# Patient Record
Sex: FEMALE | Race: BLACK OR AFRICAN AMERICAN | Employment: UNEMPLOYED | URBAN - METROPOLITAN AREA
[De-identification: names, ages, dates, MRNs, and addresses within clinical notes are randomized per-mention and may not be internally consistent; named-entity substitution may affect disease eponyms.]

---

## 2017-01-20 ENCOUNTER — HOSPITAL ENCOUNTER (OUTPATIENT)
Dept: OBGYN CLINIC | Age: 27
Discharge: HOME | End: 2017-01-20
Admitting: OBSTETRICS & GYNECOLOGY

## 2017-01-20 VITALS
WEIGHT: 145 LBS | DIASTOLIC BLOOD PRESSURE: 67 MMHG | HEART RATE: 80 BPM | BODY MASS INDEX: 25.69 KG/M2 | SYSTOLIC BLOOD PRESSURE: 107 MMHG

## 2017-01-22 LAB
ORGANISM: ABNORMAL
URINE CULTURE, ROUTINE: ABNORMAL
URINE CULTURE, ROUTINE: ABNORMAL

## 2017-01-23 LAB
CLARITY: CLEAR
COLOR: YELLOW
GLUCOSE URINE: NEGATIVE MG/DL
PROTEIN UA: NEGATIVE MG/DL

## 2017-02-17 ENCOUNTER — HOSPITAL ENCOUNTER (OUTPATIENT)
Dept: OBGYN CLINIC | Age: 27
Discharge: HOME | End: 2017-02-17
Admitting: OBSTETRICS & GYNECOLOGY

## 2017-02-17 VITALS
DIASTOLIC BLOOD PRESSURE: 74 MMHG | SYSTOLIC BLOOD PRESSURE: 113 MMHG | HEART RATE: 120 BPM | WEIGHT: 147 LBS | BODY MASS INDEX: 26.04 KG/M2

## 2017-03-03 ENCOUNTER — HOSPITAL ENCOUNTER (OUTPATIENT)
Dept: OBGYN CLINIC | Age: 27
Discharge: HOME | End: 2017-03-04
Admitting: OBSTETRICS & GYNECOLOGY

## 2017-03-03 VITALS
BODY MASS INDEX: 26.93 KG/M2 | SYSTOLIC BLOOD PRESSURE: 106 MMHG | DIASTOLIC BLOOD PRESSURE: 68 MMHG | HEART RATE: 99 BPM | WEIGHT: 152 LBS

## 2017-03-06 LAB
CLARITY: CLEAR
COLOR: YELLOW
GLUCOSE URINE: NEGATIVE MG/DL
PROTEIN UA: NEGATIVE MG/DL

## 2017-03-23 PROBLEM — R52 PAIN: Status: ACTIVE | Noted: 2017-03-23

## 2017-05-05 ENCOUNTER — HOSPITAL ENCOUNTER (OUTPATIENT)
Dept: OBGYN CLINIC | Age: 27
Discharge: HOME | End: 2017-05-06
Admitting: OBSTETRICS & GYNECOLOGY

## 2017-05-05 VITALS
DIASTOLIC BLOOD PRESSURE: 75 MMHG | BODY MASS INDEX: 23.24 KG/M2 | SYSTOLIC BLOOD PRESSURE: 117 MMHG | WEIGHT: 131.2 LBS | HEART RATE: 90 BPM

## 2017-12-15 ENCOUNTER — HOSPITAL ENCOUNTER (OUTPATIENT)
Dept: OBGYN CLINIC | Age: 27
Discharge: HOME | End: 2017-12-31
Attending: OBSTETRICS & GYNECOLOGY | Admitting: OBSTETRICS & GYNECOLOGY

## 2018-01-01 ENCOUNTER — HOSPITAL ENCOUNTER (OUTPATIENT)
Dept: OBGYN CLINIC | Age: 28
Discharge: HOME | End: 2018-01-31
Attending: OBSTETRICS & GYNECOLOGY | Admitting: OBSTETRICS & GYNECOLOGY

## 2018-01-26 ENCOUNTER — HOSPITAL ENCOUNTER (OUTPATIENT)
Dept: ULTRASOUND IMAGING | Age: 28
Discharge: HOME | End: 2018-01-26
Attending: OBSTETRICS & GYNECOLOGY | Admitting: OBSTETRICS & GYNECOLOGY

## 2018-01-26 DIAGNOSIS — Z34.90 ENCOUNTER FOR SUPERVISION OF NORMAL PREGNANCY: ICD-10-CM

## 2018-01-26 DIAGNOSIS — Z34.90 ENCOUNTER FOR SUPERVISION OF NORMAL PREGNANCY, ANTEPARTUM, UNSPECIFIED GRAVIDITY: ICD-10-CM

## 2018-01-26 PROBLEM — Z34.81 NORMAL PREGNANCY IN MULTIGRAVIDA IN FIRST TRIMESTER: Status: ACTIVE | Noted: 2018-01-26

## 2018-01-26 PROBLEM — N89.8 VAGINAL DISCHARGE DURING PREGNANCY: Status: ACTIVE | Noted: 2018-01-26

## 2018-01-26 PROBLEM — R52 PAIN: Status: RESOLVED | Noted: 2017-03-23 | Resolved: 2018-01-26

## 2018-01-26 PROBLEM — O26.899 VAGINAL DISCHARGE DURING PREGNANCY: Status: ACTIVE | Noted: 2018-01-26

## 2018-02-01 ENCOUNTER — HOSPITAL ENCOUNTER (OUTPATIENT)
Dept: OBGYN CLINIC | Age: 28
Discharge: HOME | End: 2018-02-28
Attending: OBSTETRICS & GYNECOLOGY | Admitting: OBSTETRICS & GYNECOLOGY

## 2018-02-27 ENCOUNTER — HOSPITAL ENCOUNTER (OUTPATIENT)
Dept: OBGYN CLINIC | Age: 28
Discharge: HOME | End: 2018-02-28
Admitting: OBSTETRICS & GYNECOLOGY

## 2018-02-27 VITALS
DIASTOLIC BLOOD PRESSURE: 67 MMHG | BODY MASS INDEX: 23.38 KG/M2 | SYSTOLIC BLOOD PRESSURE: 113 MMHG | WEIGHT: 132 LBS | HEART RATE: 83 BPM

## 2018-02-27 DIAGNOSIS — Z34.81 NORMAL PREGNANCY IN MULTIGRAVIDA IN FIRST TRIMESTER: ICD-10-CM

## 2018-02-27 DIAGNOSIS — Z34.90 ENCOUNTER FOR SUPERVISION OF NORMAL PREGNANCY: ICD-10-CM

## 2018-02-27 LAB
BILIRUBIN URINE: NEGATIVE MG/DL
BLOOD, URINE: ABNORMAL
CLARITY: CLEAR
COLOR: YELLOW
GLUCOSE URINE: NEGATIVE MG/DL
KETONES, URINE: NEGATIVE MG/DL
LEUKOCYTE ESTERASE, URINE: NEGATIVE
NITRITE, URINE: NEGATIVE
PH UA: 7
PROTEIN UA: NEGATIVE MG/DL
SPECIFIC GRAVITY UA: 1.01
UROBILINOGEN, URINE: 0.2 E.U./DL

## 2018-02-27 PROCEDURE — 0502F SUBSEQUENT PRENATAL CARE: CPT | Performed by: OBSTETRICS & GYNECOLOGY

## 2018-02-27 NOTE — PROGRESS NOTES
Prenatal [id number], [address] [facility], [facility] 24      OB Follow-Up Visit Progress Note    Chief Complaint   Patient presents with    Routine Prenatal Visit        Subjective:     HISTORY OF PRESENT ILLNESS (HPI)    History of  Mirna Palacios is a 33 y.o., [id number] at 16w1d who presents to the clinic for her follow-up OB visit. Lindy Schwartz  denies any complaints at this time. She does not complain of vaginal bleeding. She does not report leakage of fluid. She  does not complain of contractions. She  does not complain of decreased fetal movement. PAST MEDICAL HISTORY        Diagnosis Date    Anemia     with last pregnancy    Miscarriage     Miscarriage     D and C         PAST SURGICAL HISTORY    Past Surgical History:   Procedure Laterality Date    DILATION AND CURETTAGE OF UTERUS      MAB       FAMILY HISTORY    Family History   Problem Relation Age of Onset    Diabetes Mother     Diabetes Maternal Grandmother     Diabetes Maternal Uncle        SOCIAL HISTORY    Social History   Substance Use Topics    Smoking status: Never Smoker    Smokeless tobacco: Never Used    Alcohol use No       ALLERGIES    No Known Allergies    MEDICATIONS    Current Outpatient Prescriptions on File Prior to Encounter   Medication Sig Dispense Refill    ferrous sulfate (FE TABS) 325 (65 Fe) MG EC tablet Take 1 tablet by mouth daily (with breakfast) 90 tablet 3    Prenatal Multivit-Min-Fe-FA (PRENATAL VITAMINS) 0.8 MG TABS Take 1 tablet by mouth daily 30 tablet 5     No current facility-administered medications on file prior to encounter. REVIEW OF SYSTEMS    Pertinent items are noted in HPI.     Objective:      /67   Pulse 83   Wt 132 lb (59.9 kg)   LMP 11/06/2017   BMI 23.38 kg/m²     Wt Readings from Last 2 Encounters:   02/27/18 132 lb (59.9 kg)   01/26/18 123 lb 9.6 oz (56.1 kg)       See ACOG flowsheet above    Physical Exam:  A physical exam was performed during this visit. Neurologic/Psychiatric: alert and oriented X3  Constitutional: alert and oriented to person, place and time, well-developed and well-nourished, in no acute distress  Cardiovascular: Edema  is not present. Respiratory effort: Respirations  are easy and without stridor. Gastrointestinal: Abdomen  is soft and  is non tender and gravid  Vagina: Not examined at this visit. Bladder: Not examined at this visit. Uterus:  Non tender. Pelvic Exam:  A pelvic exam was not completed during this visit. Verbal consent obtained for pelvic exam:  NA  Cervix: N/A. Specimens obtained: None. Response to pelvic exam:  na      Assessment:     Patient Active Problem List   Diagnosis Code    Sickle cell trait ([facility] 75.) D57.3    32 yo [id number] Z34.81    Vaginal discharge during pregnancy O26.899, N89.8       33 y.o. [id number] 16w1d    Obstetrician: Yovanny Meier MD      Plan:     - Prenatal labs: Reviewed by the physician  - Patient to follow up in: 1 month,Quad disc declined,US planned    Kick count N/A. Pregnancy expectations were discussed and all questions were answered.             Electronically signed by Yovanny Meier MD on 2/27/2018 at 1:28 PM

## 2018-02-27 NOTE — PLAN OF CARE
Prenatal Clinic  Clinical Level of [address]    NAME: Reji Herrera OF BIRTH:  1990 GENDER: female  MEDICAL RECORD NUMBER:  [id number]  DATE:  2/27/2018    Assessment   Points   No Assessment []   0   General Prenatal assessment (e.g. weight, vital signs, urine test). Completed OB Clinic navigator tabs, in partnership with the Registered Nurse. [x]   1   General Prenatal assessment (e.g. weight, vital signs, urine test). Completed OB Clinic navigator tabs, in partnership with the Registered Nurse. Set up tests or procedures (e.g. ultrasound, specimen swabs, induction). []   2   Full Prenatal assessment (e.g. weight, vitals signs, urine test) to include a full review of history. Completed OB Clinic navigator tabs, in partnership with the Registered Nurse. Set up tests or procedures (e.g. ultrasound, specimen swabs, induction). Or transfer to an outside facility, transfer to [facility] for further evaluation, admission to the hospital.  []   3       Ambulation Status   Status Definition Points   Independent Independently able to ambulate. Fully able (without any assistance) to get on/off exam table/chair. [x]   0   Minimal Physical Assistance Requires physical assistance of one person to ambulate and/or position patient to be examined. Includes necessary physical assistance to position lower extremities on/off stool. []   1   Moderate Physical Assistance Requires at least one staff member to physically assist patient in ambulating into treatment room, and on/off recliner chair. []   2   Full Assistance Requires assistance of at least two staff members to transfer patient into treatment room and/or on/off exam table/chair. \"Total Transfer\". []   3       Teaching Effort   Effort Definition Points   No Teaching  []   0   General The teaching will focus on visit schedule and laboratory tests. This education can be found in the Discharge Instructions.  [x]   1 Intermediate The teaching will focus on visit schedule and laboratory tests, plus additional topics such as health and lifestyle (e.g. kick counts, diet). This education can be found in the Discharge Instructions []   2   Complex New patient information packet given to the patient/caregiver and reviewed with the Registered Nurse.   []   3       Patient Discharge and Planning  Planning Definition Points   General Follow-up with routine assessment and planning. Discharge instructions and After Visit Summary given to patient/caregiver and reviewed with the Registered Nurse. Simple follow-up with routine assessment and planning. [x]   1   Intermediate Follow-up with routine assessment and planning. Discharge instructions and After Visit Summary given to patient/caregiver and reviewed with the Registered Nurse. Contact with additional resources (e.g. , Physician, Lactation). May include filling out forms, writing letters, communication with insurance , FLMA forms, etc.   []   2   Complex Full, comprehensive assessment and planning which includes assistance for a hospital admission or transfer to a higher level of care facility.   []   3     Is this the Patient's First Visit to the Prenatal Clinic    No     Is this Patient Established @ this [facility] \[facility]\"" AT [place]  Yes             Clinical Level of Care      Points  0-3  Level 1 [x]     Points  4-6  Level 2 []     Points  7-8  Level 3 []     Points  9-10  Level 4 []     Points  11-12  Level 5 []       Electronically signed by Karl Ferrell RN on 2/27/2018 at 3:57 PM

## 2018-03-01 ENCOUNTER — HOSPITAL ENCOUNTER (OUTPATIENT)
Dept: OBGYN CLINIC | Age: 28
Discharge: HOME | End: 2018-03-31
Attending: OBSTETRICS & GYNECOLOGY | Admitting: OBSTETRICS & GYNECOLOGY

## 2018-04-02 ENCOUNTER — HOSPITAL ENCOUNTER (OUTPATIENT)
Dept: OBGYN CLINIC | Age: 28
Discharge: HOME | End: 2018-04-30
Attending: OBSTETRICS & GYNECOLOGY | Admitting: OBSTETRICS & GYNECOLOGY

## 2018-04-03 ENCOUNTER — HOSPITAL ENCOUNTER (OUTPATIENT)
Dept: OBGYN CLINIC | Age: 28
Discharge: HOME | End: 2018-04-04
Admitting: OBSTETRICS & GYNECOLOGY

## 2018-04-03 ENCOUNTER — HOSPITAL ENCOUNTER (OUTPATIENT)
Dept: ULTRASOUND IMAGING | Age: 28
Discharge: HOME | End: 2018-04-03
Attending: OBSTETRICS & GYNECOLOGY | Admitting: OBSTETRICS & GYNECOLOGY

## 2018-04-03 VITALS
SYSTOLIC BLOOD PRESSURE: 113 MMHG | DIASTOLIC BLOOD PRESSURE: 76 MMHG | HEART RATE: 82 BPM | WEIGHT: 137.2 LBS | BODY MASS INDEX: 24.3 KG/M2

## 2018-04-03 DIAGNOSIS — Z34.90 ENCOUNTER FOR SUPERVISION OF NORMAL PREGNANCY: ICD-10-CM

## 2018-04-03 DIAGNOSIS — Z34.81 NORMAL PREGNANCY IN MULTIGRAVIDA IN FIRST TRIMESTER: ICD-10-CM

## 2018-04-03 DIAGNOSIS — Z34.81 NORMAL PREGNANCY IN MULTIGRAVIDA IN FIRST TRIMESTER: Primary | ICD-10-CM

## 2018-04-03 PROCEDURE — 0502F SUBSEQUENT PRENATAL CARE: CPT | Performed by: OBSTETRICS & GYNECOLOGY

## 2018-05-01 ENCOUNTER — HOSPITAL ENCOUNTER (OUTPATIENT)
Dept: OBGYN CLINIC | Age: 28
Discharge: HOME | End: 2018-05-02
Admitting: OBSTETRICS & GYNECOLOGY

## 2018-05-01 ENCOUNTER — HOSPITAL ENCOUNTER (OUTPATIENT)
Dept: OBGYN CLINIC | Age: 28
Discharge: HOME | End: 2018-05-01
Attending: OBSTETRICS & GYNECOLOGY | Admitting: OBSTETRICS & GYNECOLOGY

## 2018-05-01 ENCOUNTER — HOSPITAL ENCOUNTER (OUTPATIENT)
Dept: OBGYN CLINIC | Age: 28
Discharge: HOME | End: 2018-05-31
Admitting: OBSTETRICS & GYNECOLOGY

## 2018-05-01 VITALS
SYSTOLIC BLOOD PRESSURE: 104 MMHG | BODY MASS INDEX: 25.37 KG/M2 | WEIGHT: 143.2 LBS | DIASTOLIC BLOOD PRESSURE: 63 MMHG | HEART RATE: 91 BPM

## 2018-05-01 DIAGNOSIS — Z34.81 NORMAL PREGNANCY IN MULTIGRAVIDA IN FIRST TRIMESTER: ICD-10-CM

## 2018-05-01 DIAGNOSIS — Z34.90 ENCOUNTER FOR SUPERVISION OF NORMAL PREGNANCY: ICD-10-CM

## 2018-05-01 DIAGNOSIS — Z34.81 NORMAL PREGNANCY IN MULTIGRAVIDA IN FIRST TRIMESTER: Primary | ICD-10-CM

## 2018-05-01 LAB
ABO/RH: NORMAL
ANTIBODY SCREEN: NORMAL
BILIRUBIN URINE: NEGATIVE MG/DL
BLOOD, URINE: ABNORMAL
CLARITY: CLEAR
COLOR: YELLOW
GLUCOSE CHALLENGE: 95 MG/DL
GLUCOSE URINE: NEGATIVE MG/DL
HCT VFR BLD CALC: 28.1 % (ref 36–48)
HEMOGLOBIN: 9 G/DL (ref 12–16)
KETONES, URINE: NEGATIVE MG/DL
LEUKOCYTE ESTERASE, URINE: NEGATIVE
MCH RBC QN AUTO: 22.5 PG (ref 26–34)
MCHC RBC AUTO-ENTMCNC: 31.9 G/DL (ref 31–36)
MCV RBC AUTO: 70.6 FL (ref 80–100)
NITRITE, URINE: NEGATIVE
PDW BLD-RTO: 13.8 % (ref 12.4–15.4)
PH UA: 6
PLATELET # BLD: 237 K/UL (ref 135–450)
PMV BLD AUTO: 8 FL (ref 5–10.5)
PROTEIN UA: NEGATIVE MG/DL
RBC # BLD: 3.98 M/UL (ref 4–5.2)
SPECIFIC GRAVITY UA: 1.02
UROBILINOGEN, URINE: 0.2 E.U./DL
WBC # BLD: 6.2 K/UL (ref 4–11)

## 2018-05-01 PROCEDURE — 0502F SUBSEQUENT PRENATAL CARE: CPT | Performed by: OBSTETRICS & GYNECOLOGY

## 2018-05-01 NOTE — PROGRESS NOTES
above    Physical Exam:  A physical exam was not performed during this visit. Pelvic Exam:  A pelvic exam was not completed during this visit. Assessment:     Patient Active Problem List   Diagnosis Code    [facility] D57.[facility]    34 yo [id number] Z34.81    Vaginal discharge during pregnancy O26.899, N89.8       33 y.o. [id number] 25w1d    Obstetrician: Marcos Negron MD      Plan:     - Prenatal labs: Reviewed by the physician  - Patient to follow up in: 1 month  GCT and labs today  Sono next visit for f/u anatomy  Urine culture today -- FOB does not have insurance to test for sickle cell status but does not think he is carrier    Kick count N/A. Pregnancy expectations were discussed and all questions were answered.             Electronically signed by Marcos Negron MD on 5/1/2018 at 2:01 PM

## 2018-05-02 LAB
RPR: NORMAL
URINE CULTURE, ROUTINE: NORMAL

## 2018-05-04 DIAGNOSIS — O99.012 ANEMIA AFFECTING PREGNANCY IN SECOND TRIMESTER: Primary | ICD-10-CM

## 2018-05-08 ENCOUNTER — TELEPHONE (OUTPATIENT)
Dept: OBGYN CLINIC | Age: 28
End: 2018-05-08

## 2018-05-29 ENCOUNTER — HOSPITAL ENCOUNTER (OUTPATIENT)
Dept: OBGYN CLINIC | Age: 28
Discharge: HOME | End: 2018-05-30
Admitting: OBSTETRICS & GYNECOLOGY

## 2018-05-29 ENCOUNTER — HOSPITAL ENCOUNTER (OUTPATIENT)
Dept: ULTRASOUND IMAGING | Age: 28
Discharge: HOME | End: 2018-05-29
Attending: OBSTETRICS & GYNECOLOGY | Admitting: OBSTETRICS & GYNECOLOGY

## 2018-05-29 VITALS
WEIGHT: 146.4 LBS | BODY MASS INDEX: 25.93 KG/M2 | DIASTOLIC BLOOD PRESSURE: 73 MMHG | HEART RATE: 91 BPM | SYSTOLIC BLOOD PRESSURE: 113 MMHG

## 2018-05-29 DIAGNOSIS — Z34.81 NORMAL PREGNANCY IN MULTIGRAVIDA IN FIRST TRIMESTER: ICD-10-CM

## 2018-05-29 DIAGNOSIS — Z34.90 ENCOUNTER FOR SUPERVISION OF NORMAL PREGNANCY: ICD-10-CM

## 2018-05-29 PROCEDURE — 0502F SUBSEQUENT PRENATAL CARE: CPT | Performed by: OBSTETRICS & GYNECOLOGY

## 2018-05-29 NOTE — PROGRESS NOTES
Prenatal [address], [address] [place] [address] 24      OB Follow-Up Visit Progress Note    Chief Complaint   Patient presents with    Routine Prenatal Visit        Subjective:     HISTORY OF PRESENT ILLNESS (HPI)    History of  Fouzia Isbell is a 33 y.o., [id number] at 29w1d who presents to the clinic for her follow-up OB visit. Lindy Lyons  denies any complaints at this time. She does not complain of vaginal bleeding. She does not report leakage of fluid. She  does not complain of contractions. She  does not complain of decreased fetal movement. REPORTS EARLY  YEAST INFN-OK FOR OTC/PRECAUTIONS    PAST MEDICAL HISTORY        Diagnosis Date    Anemia     with last pregnancy    Miscarriage     Miscarriage     D and C         PAST SURGICAL HISTORY    Past Surgical History:   Procedure Laterality Date    DILATION AND CURETTAGE OF UTERUS      MAB       FAMILY HISTORY    Family History   Problem Relation Age of Onset    Diabetes Mother     Diabetes Maternal Grandmother     Diabetes Maternal Uncle        SOCIAL HISTORY    Social History   Substance Use Topics    Smoking status: Never Smoker    Smokeless tobacco: Never Used    Alcohol use No       ALLERGIES    No Known Allergies    MEDICATIONS    Prior to Admission medications    Medication Sig Start Date End Date Taking? Authorizing Provider   ferrous sulfate (FE TABS) 325 (65 Fe) MG EC tablet Take 1 tablet by mouth daily (with breakfast) 1/30/18  Yes Yaneth Jason MD   Prenatal Multivit-Min-Fe-FA (PRENATAL VITAMINS) 0.8 MG TABS Take 1 tablet by mouth daily 1/26/18  Yes Jess Patrick MD       REVIEW OF SYSTEMS    Pertinent items are noted in HPI.     Objective:      /73   Pulse 91   Wt 146 lb 6.4 oz (66.4 kg)   LMP 11/06/2017   BMI 25.93 kg/m²     Wt Readings from Last 2 Encounters:   05/29/18 146 lb 6.4 oz (66.4 kg)   05/01/18 143 lb 3.2 oz (65 kg)       See ACOG

## 2018-05-30 LAB
BILIRUBIN URINE: NEGATIVE MG/DL
BLOOD, URINE: ABNORMAL
CLARITY: CLEAR
COLOR: YELLOW
GLUCOSE URINE: NEGATIVE MG/DL
KETONES, URINE: NEGATIVE MG/DL
LEUKOCYTE ESTERASE, URINE: NEGATIVE
NITRITE, URINE: NEGATIVE
PH UA: 7
PROTEIN UA: NEGATIVE MG/DL
SPECIFIC GRAVITY UA: 1.02
UROBILINOGEN, URINE: 0.2 E.U./DL

## 2018-06-01 ENCOUNTER — HOSPITAL ENCOUNTER (OUTPATIENT)
Dept: OBGYN CLINIC | Age: 28
Discharge: HOME | End: 2018-06-30
Attending: OBSTETRICS & GYNECOLOGY | Admitting: OBSTETRICS & GYNECOLOGY

## 2018-06-01 NOTE — PLAN OF CARE
Intermediate The teaching will focus on visit schedule and laboratory tests, plus additional topics such as health and lifestyle (e.g. kick counts, diet). This education can be found in the Discharge Instructions [x]   2   Complex New patient information packet given to the patient/caregiver and reviewed with the Registered Nurse.   []   3       Patient Discharge and Planning  Planning Definition Points   General Follow-up with routine assessment and planning. Discharge instructions and After Visit Summary given to patient/caregiver and reviewed with the Registered Nurse. Simple follow-up with routine assessment and planning. [x]   1   Intermediate Follow-up with routine assessment and planning. Discharge instructions and After Visit Summary given to patient/caregiver and reviewed with the Registered Nurse. Contact with additional resources (e.g. , Physician, Lactation). May include filling out forms, writing letters, communication with insurance , FLMA forms, etc.   []   2   Complex Full, comprehensive assessment and planning which includes assistance for a hospital admission or transfer to a higher level of care facility.   []   3     Is this the Patient's First Visit to the Prenatal Clinic    No     Is this Patient Established @ this [facility] AT [place]  Yes             Clinical Level of Care      Points  0-3  Level 1 []     Points  4-6  Level 2 [x]     Points  7-8  Level 3 []     Points  9-10  Level 4 []     Points  11-12  Level 5 []       Electronically signed by Tacho Mensah RN on 6/1/2018 at 9:35 AM

## 2018-06-12 ENCOUNTER — HOSPITAL ENCOUNTER (OUTPATIENT)
Dept: OBGYN CLINIC | Age: 28
Discharge: HOME | End: 2018-06-13
Admitting: OBSTETRICS & GYNECOLOGY

## 2018-06-12 VITALS
WEIGHT: 147.6 LBS | BODY MASS INDEX: 26.15 KG/M2 | HEART RATE: 103 BPM | DIASTOLIC BLOOD PRESSURE: 59 MMHG | SYSTOLIC BLOOD PRESSURE: 95 MMHG

## 2018-06-12 DIAGNOSIS — O99.012 ANEMIA AFFECTING PREGNANCY IN SECOND TRIMESTER: ICD-10-CM

## 2018-06-12 DIAGNOSIS — Z34.90 ENCOUNTER FOR SUPERVISION OF NORMAL PREGNANCY: ICD-10-CM

## 2018-06-12 PROCEDURE — 0502F SUBSEQUENT PRENATAL CARE: CPT | Performed by: OBSTETRICS & GYNECOLOGY

## 2018-06-12 NOTE — PROGRESS NOTES
above    Physical Exam:  A physical exam was performed during this visit. Neurologic/Psychiatric: alert and oriented X3  Constitutional: alert and oriented to person, place and time, well-developed and well-nourished, in no acute distress  Cardiovascular: Edema  is not present. Respiratory effort: Respirations  are easy   Gastrointestinal: Abdomen  is soft and  is non tender and gravid  Vagina: Not examined at this visit. Bladder: Not examined at this visit. Uterus:  Non tender. Pelvic Exam:  A pelvic exam was not completed at this visit. Assessment:     Patient Active Problem List   Diagnosis Code    [facility] D57.[facility]    32 yo [id number] Z34.81    Vaginal discharge during pregnancy O26.899, N89.8    Anemia affecting pregnancy in second trimester O99.012       33 y.o. [id number] 31w1d    Obstetrician: Taylor Logan MD      Plan:     - Prenatal labs: Reviewed by the physician  - 3rd trimester urine culture obtained and normal  - continue iron supplementation, add stool softener  - Tums PRN epigastric pain. Call for increasing concerns. - Patient to follow up in: 2 weeks    Kick count reinforced. Pregnancy expectations were discussed and all questions were answered. 50% of time was spent discussing findings, management, and treatment options.             Electronically signed by Taylor Logan MD on 6/12/2018 at 3:18 PM

## 2018-06-26 ENCOUNTER — HOSPITAL ENCOUNTER (OUTPATIENT)
Dept: OBGYN CLINIC | Age: 28
Discharge: HOME | End: 2018-06-27
Admitting: OBSTETRICS & GYNECOLOGY

## 2018-06-26 VITALS
DIASTOLIC BLOOD PRESSURE: 76 MMHG | SYSTOLIC BLOOD PRESSURE: 111 MMHG | BODY MASS INDEX: 26.82 KG/M2 | HEART RATE: 81 BPM | WEIGHT: 151.4 LBS

## 2018-06-26 LAB
BILIRUBIN URINE: NEGATIVE MG/DL
BLOOD, URINE: ABNORMAL
CLARITY: ABNORMAL
COLOR: YELLOW
GLUCOSE URINE: NEGATIVE MG/DL
KETONES, URINE: NEGATIVE MG/DL
LEUKOCYTE ESTERASE, URINE: ABNORMAL
NITRITE, URINE: NEGATIVE
PH UA: 6
PROTEIN UA: NEGATIVE MG/DL
SPECIFIC GRAVITY UA: 1.01
UROBILINOGEN, URINE: 0.2 E.U./DL

## 2018-06-26 PROCEDURE — 0502F SUBSEQUENT PRENATAL CARE: CPT | Performed by: OBSTETRICS & GYNECOLOGY

## 2018-06-26 NOTE — PROGRESS NOTES
Prenatal [address] [place] [id number], 1500 [facility], [facility] 24      OB Follow-Up Visit Progress Note    Chief Complaint   Patient presents with    Routine Prenatal Visit        Subjective:     HISTORY OF PRESENT ILLNESS (HPI)    History of  Sheryll Riedel is a 33 y.o., [id number] at 33w1d who presents to the clinic for her follow-up OB visit. Lindy Lamas  denies any complaints at this time. She does not complain of vaginal bleeding. She does not report leakage of fluid. She  does not complain of contractions. She  does not complain of decreased fetal movement. PAST MEDICAL HISTORY        Diagnosis Date    Anemia     with last pregnancy    Miscarriage     Miscarriage     D and C         PAST SURGICAL HISTORY    Past Surgical History:   Procedure Laterality Date    DILATION AND CURETTAGE OF UTERUS      MAB       FAMILY HISTORY    Family History   Problem Relation Age of Onset    Diabetes Mother     Diabetes Maternal Grandmother     Diabetes Maternal Uncle        SOCIAL HISTORY    Social History   Substance Use Topics    Smoking status: Never Smoker    Smokeless tobacco: Never Used    Alcohol use No       OB HISTORY    Obstetric History       T1      L1     SAB1   TAB0   Ectopic0   Molar0   Multiple0   Live Births1       # Outcome Date GA Lbr Rafael/2nd Weight Sex Delivery Anes PTL Lv   4 Current            3  17 36w6d  5 lb 9.2 oz (2.53 kg) F Vag-Spont EPI Y JUSTINE      Name: Adiel Antes:  7                Apgar5: 9   2 Term 14 38w6d  7 lb 7 oz (3.374 kg)  Vag-Spont         Apgar1:  7                Apgar5: 9   1 2013                  ALLERGIES    No Known Allergies    MEDICATIONS    Prior to Admission medications    Medication Sig Start Date End Date Taking?  Authorizing Provider   ferrous sulfate (FE TABS) 325 (65 Fe) MG EC tablet Take 1 tablet by mouth daily (with breakfast) 18  Yes

## 2018-06-26 NOTE — PLAN OF CARE
Prenatal Clinic  Clinical Level of [address]    NAME: Cam Chou OF BIRTH:  1990 GENDER: female  MEDICAL RECORD NUMBER:  [id number]  DATE:  6/26/2018    Assessment   Points   No Assessment []   0   General Prenatal assessment (e.g. weight, vital signs, urine test). Completed OB Clinic navigator tabs, in partnership with the Registered Nurse. [x]   1   General Prenatal assessment (e.g. weight, vital signs, urine test). Completed OB Clinic navigator tabs, in partnership with the Registered Nurse. Set up tests or procedures (e.g. ultrasound, specimen swabs, induction). []   2   Full Prenatal assessment (e.g. weight, vitals signs, urine test) to include a full review of history. Completed OB Clinic navigator tabs, in partnership with the Registered Nurse. Set up tests or procedures (e.g. ultrasound, specimen swabs, induction). Or transfer to an outside facility, transfer to [facility] for further evaluation, admission to the hospital.  []   3       Ambulation Status   Status Definition Points   Independent Independently able to ambulate. Fully able (without any assistance) to get on/off exam table/chair. [x]   0   Minimal Physical Assistance Requires physical assistance of one person to ambulate and/or position patient to be examined. Includes necessary physical assistance to position lower extremities on/off stool. []   1   Moderate Physical Assistance Requires at least one staff member to physically assist patient in ambulating into treatment room, and on/off recliner chair. []   2   Full Assistance Requires assistance of at least two staff members to transfer patient into treatment room and/or on/off exam table/chair. \"Total Transfer\". []   3       Teaching Effort   Effort Definition Points   No Teaching  []   0   General The teaching will focus on visit schedule and laboratory tests. This education can be found in the Discharge Instructions.  [x]   1

## 2018-06-27 LAB — URINE CULTURE, ROUTINE: NORMAL

## 2018-07-01 ENCOUNTER — HOSPITAL ENCOUNTER (OUTPATIENT)
Dept: OBGYN CLINIC | Age: 28
Discharge: HOME | End: 2018-07-31
Attending: OBSTETRICS & GYNECOLOGY | Admitting: OBSTETRICS & GYNECOLOGY

## 2018-07-10 ENCOUNTER — HOSPITAL ENCOUNTER (OUTPATIENT)
Dept: OBGYN CLINIC | Age: 28
Discharge: HOME | End: 2018-07-11
Admitting: OBSTETRICS & GYNECOLOGY

## 2018-07-10 VITALS
DIASTOLIC BLOOD PRESSURE: 72 MMHG | WEIGHT: 153 LBS | HEART RATE: 93 BPM | BODY MASS INDEX: 27.1 KG/M2 | SYSTOLIC BLOOD PRESSURE: 118 MMHG

## 2018-07-10 PROCEDURE — 0502F SUBSEQUENT PRENATAL CARE: CPT | Performed by: OBSTETRICS & GYNECOLOGY

## 2018-07-10 NOTE — PROGRESS NOTES
Yes Shar Ramey MD   Prenatal Multivit-Min-Fe-FA (PRENATAL VITAMINS) 0.8 MG TABS Take 1 tablet by mouth daily 1/26/18  Yes Tiffanie Alves MD       REVIEW OF SYSTEMS    Pertinent items are noted in HPI. Objective:      /72   Pulse 93   Wt 153 lb (69.4 kg)   LMP 11/06/2017   BMI 27.10 kg/m²     Wt Readings from Last 2 Encounters:   07/10/18 153 lb (69.4 kg)   06/26/18 151 lb 6.4 oz (68.7 kg)       See ACOG flowsheet above    Physical Exam:  A physical exam was performed during this visit. Neurologic/Psychiatric: alert and oriented X3  Constitutional: alert and oriented to person, place and time, well-developed and well-nourished, in no acute distress  Gastrointestinal: Abdomen  is soft and  is non tender and gravid  Vagina: Normal vagina. Bladder: Not examined at this visit. Uterus:  Non tender. Pelvic Exam:  A pelvic exam was completed during this visit. Verbal consent obtained for pelvic exam:  yes  Cervix: ft/40/-3. Specimens obtained: GBS. Response to pelvic exam:  Well tolerated by patient. Assessment:     Patient Active Problem List   Diagnosis Code    [facility] D57.3    32 yo [id number] Z34.81    Vaginal discharge during pregnancy O26.899, N89.8    Anemia affecting pregnancy in second trimester O99.012       33 y.o. [id number] 35w1d    Obstetrician: Candie Rodriguez MD      Plan:     - Prenatal labs: Reviewed by the physician  - GBS obtained today  -precautions, kick counts reviewed  - Patient to follow up in:  1 week      Kick count reinforced. Pregnancy expectations were discussed and all questions were answered. 50% of time was spent discussing findings, management, and treatment options.             Electronically signed by Candie Rodriguez MD on 7/10/2018 at 2:19 PM

## 2018-07-13 LAB
GROUP B STREP CULTURE: ABNORMAL
GROUP B STREP CULTURE: ABNORMAL
ORGANISM: ABNORMAL

## 2018-07-17 ENCOUNTER — HOSPITAL ENCOUNTER (OUTPATIENT)
Dept: OBGYN CLINIC | Age: 28
Discharge: HOME | End: 2018-07-18
Admitting: OBSTETRICS & GYNECOLOGY

## 2018-07-17 VITALS
BODY MASS INDEX: 28.13 KG/M2 | WEIGHT: 158.8 LBS | HEART RATE: 94 BPM | DIASTOLIC BLOOD PRESSURE: 73 MMHG | SYSTOLIC BLOOD PRESSURE: 118 MMHG

## 2018-07-17 LAB
BILIRUBIN URINE: NEGATIVE MG/DL
BLOOD, URINE: ABNORMAL
CLARITY: CLEAR
COLOR: YELLOW
GLUCOSE URINE: NEGATIVE MG/DL
KETONES, URINE: NEGATIVE MG/DL
LEUKOCYTE ESTERASE, URINE: NEGATIVE
NITRITE, URINE: NEGATIVE
PH UA: 5.5
PROTEIN UA: NEGATIVE MG/DL
SPECIFIC GRAVITY UA: 1.01
UROBILINOGEN, URINE: 0.2 E.U./DL

## 2018-07-17 PROCEDURE — 0502F SUBSEQUENT PRENATAL CARE: CPT | Performed by: OBSTETRICS & GYNECOLOGY

## 2018-07-17 NOTE — PROGRESS NOTES
Prenatal [address], [address] [place] [address] 24      OB Follow-Up Visit Progress Note    Chief Complaint   Patient presents with    Routine Prenatal Visit        Subjective:     HISTORY OF PRESENT ILLNESS (HPI)    History of  Kia Larose is a 33 y.o., [id number] at 36w1d who presents to the clinic for her follow-up OB visit. Lindy Harvey   denies any complaints at this time. She does not complain of vaginal bleeding. She does not report leakage of fluid. She  does not complain of contractions. She  does not complain of decreased fetal movement. PAST MEDICAL HISTORY        Diagnosis Date    Anemia     with last pregnancy    Miscarriage     Miscarriage     D and C         PAST SURGICAL HISTORY    Past Surgical History:   Procedure Laterality Date    DILATION AND CURETTAGE OF UTERUS      MAB       FAMILY HISTORY    Family History   Problem Relation Age of Onset    Diabetes Mother     Diabetes Maternal Grandmother     Diabetes Maternal Uncle        SOCIAL HISTORY    Social History   Substance Use Topics    Smoking status: Never Smoker    Smokeless tobacco: Never Used    Alcohol use No       OB HISTORY    Obstetric History       T1      L1     SAB1   TAB0   Ectopic0   Molar0   Multiple0   Live Births1       # Outcome Date GA Lbr Rafael/2nd Weight Sex Delivery Anes PTL Lv   4 Current            3  17 36w6d  5 lb 9.2 oz (2.53 kg) F Vag-Spont EPI Y JUSTINE      Name: Griffni Hard:  7                Apgar5: 9   2 Term 14 38w6d  7 lb 7 oz (3.374 kg)  Vag-Spont         Apgar1:  7                Apgar5: 9   1 2013                  ALLERGIES    No Known Allergies    MEDICATIONS    Prior to Admission medications    Medication Sig Start Date End Date Taking?  Authorizing Provider   ferrous sulfate (FE TABS) 325 (65 Fe) MG EC tablet Take 1 tablet by mouth daily (with breakfast) 18  Yes Sheldon Delcid MD   Prenatal Multivit-Min-Fe-FA (PRENATAL VITAMINS) 0.8 MG TABS Take 1 tablet by mouth daily 1/26/18  Yes Gurdeep Kinney MD       REVIEW OF SYSTEMS    Pertinent items are noted in HPI. Objective:      /73   Pulse 94   Wt 158 lb 12.8 oz (72 kg)   LMP 11/06/2017   BMI 28.13 kg/m²     Wt Readings from Last 2 Encounters:   07/17/18 158 lb 12.8 oz (72 kg)   07/10/18 153 lb (69.4 kg)       See ACOG flowsheet above    Physical Exam:  A physical exam was performed during this visit. Neurologic/Psychiatric: alert and oriented X3  Constitutional: alert and oriented to person, place and time, well-developed and well-nourished, in no acute distress  Cardiovascular: Edema  is not present. Respiratory effort: Respirations  are easy and without stridor. Gastrointestinal: Abdomen  is soft and  is non tender and gravid  Vagina: Normal vagina. Bladder: non tender to palpitation. Uterus:  Non tender. Pelvic Exam:  A pelvic exam was completed during this visit. Verbal consent obtained for pelvic exam:  yes  Cervix: FT 40. Specimens obtained: None. Response to pelvic exam:  Well tolerated by patient. Assessment:     Patient Active Problem List   Diagnosis Code    Sickle cell trait ([facility] D57.3    32 yo [id number] Z34.81    Vaginal discharge during pregnancy O26.899, N89.8    Anemia affecting pregnancy in second trimester O99.012       33 y.o. [id number] 36w1d    Obstetrician: Ortiz Monahan MD      Plan:     - Prenatal labs: Reviewed by the physician  - Patient to follow up in:  1 week      Kick count reinforced. Pregnancy expectations were discussed and all questions were answered.               Electronically signed by Ortiz Monahan MD on 7/17/2018 at 3:03 PM

## 2018-07-17 NOTE — PLAN OF CARE
Intermediate The teaching will focus on visit schedule and laboratory tests, plus additional topics such as health and lifestyle (e.g. kick counts, diet). This education can be found in the Discharge Instructions []   2   Complex New patient information packet given to the patient/caregiver and reviewed with the Registered Nurse.   []   3       Patient Discharge and Planning  Planning Definition Points   General Follow-up with routine assessment and planning. Discharge instructions and After Visit Summary given to patient/caregiver and reviewed with the Registered Nurse. Simple follow-up with routine assessment and planning. [x]   1   Intermediate Follow-up with routine assessment and planning. Discharge instructions and After Visit Summary given to patient/caregiver and reviewed with the Registered Nurse. Contact with additional resources (e.g. , Physician, Lactation). May include filling out forms, writing letters, communication with insurance , FLMA forms, etc.   []   2   Complex Full, comprehensive assessment and planning which includes assistance for a hospital admission or transfer to a higher level of care facility.   []   3     Is this the Patient's First Visit to the Prenatal Clinic    No     Is this Patient Established @ this [facility] AT [place]  Yes             Clinical Level of Care      Points  0-3  Level 1 [x]     Points  4-6  Level 2 []     Points  7-8  Level 3 []     Points  9-10  Level 4 []     Points  11-12  Level 5 []       Electronically signed by Lauri English RN on 7/17/2018 at 4:15 PM

## 2018-07-24 ENCOUNTER — HOSPITAL ENCOUNTER (OUTPATIENT)
Dept: OBGYN CLINIC | Age: 28
Discharge: HOME | End: 2018-07-25
Admitting: OBSTETRICS & GYNECOLOGY

## 2018-07-24 VITALS
DIASTOLIC BLOOD PRESSURE: 89 MMHG | BODY MASS INDEX: 28.59 KG/M2 | SYSTOLIC BLOOD PRESSURE: 134 MMHG | WEIGHT: 161.4 LBS | HEART RATE: 78 BPM

## 2018-07-24 DIAGNOSIS — Z34.81 NORMAL PREGNANCY IN MULTIGRAVIDA IN FIRST TRIMESTER: ICD-10-CM

## 2018-07-24 LAB
BILIRUBIN URINE: NEGATIVE MG/DL
BLOOD, URINE: ABNORMAL
CLARITY: CLEAR
COLOR: YELLOW
GLUCOSE URINE: NEGATIVE MG/DL
KETONES, URINE: NEGATIVE MG/DL
LEUKOCYTE ESTERASE, URINE: NEGATIVE
NITRITE, URINE: NEGATIVE
PH UA: 6
PROTEIN UA: 30 MG/DL
SPECIFIC GRAVITY UA: 1.01
UROBILINOGEN, URINE: 0.2 E.U./DL

## 2018-07-24 PROCEDURE — 0502F SUBSEQUENT PRENATAL CARE: CPT | Performed by: OBSTETRICS & GYNECOLOGY

## 2018-07-24 NOTE — H&P
Prenatal [address], [address] [facility], [facility] 24      OB Follow-Up Visit Progress Note    Chief Complaint   Patient presents with    Routine Prenatal Visit        Subjective:     HISTORY OF PRESENT ILLNESS (HPI)    History of  Lela Moy is a 33 y.o., [id number] at 42w4d who presents to the clinic for her follow-up OB visit. Lindy Reaves  c/o feet and hands swelling. Denies s/sx of preeclampsia  She does not complain of vaginal bleeding. She does not report leakage of fluid. She  does not complain of contractions. She  does not complain of decreased fetal movement. PAST MEDICAL HISTORY        Diagnosis Date    Anemia     with last pregnancy    Miscarriage     Miscarriage     D and C         PAST SURGICAL HISTORY    Past Surgical History:   Procedure Laterality Date    DILATION AND CURETTAGE OF UTERUS      MAB       FAMILY HISTORY    Family History   Problem Relation Age of Onset    Diabetes Mother     Diabetes Maternal Grandmother     Diabetes Maternal Uncle        SOCIAL HISTORY    Social History   Substance Use Topics    Smoking status: Never Smoker    Smokeless tobacco: Never Used    Alcohol use No       OB HISTORY    Obstetric History       T1      L1     SAB1   TAB0   Ectopic0   Molar0   Multiple0   Live Births1       # Outcome Date GA Lbr Rafael/2nd Weight Sex Delivery Anes PTL Lv   4 Current            3  17 36w6d  5 lb 9.2 oz (2.53 kg) F Vag-Spont EPI Y JUSTINE      Name: Sara Hummel:  7                Apgar5: 9   2 Term 14 38w6d  7 lb 7 oz (3.374 kg)  Vag-Spont         Apgar1:  7                Apgar5: 9   1 2013                  ALLERGIES    No Known Allergies    MEDICATIONS    Prior to Admission medications    Medication Sig Start Date End Date Taking?  Authorizing Provider   ferrous sulfate (FE TABS) 325 (65 Fe) MG EC tablet Take 1 tablet by mouth daily (with breakfast) 1/30/18  Yes Yaneth Jason MD   Prenatal Multivit-Min-Fe-FA (PRENATAL VITAMINS) 0.8 MG TABS Take 1 tablet by mouth daily 1/26/18  Yes Jess Patrick MD       REVIEW OF SYSTEMS    Pertinent items are noted in HPI. Objective:      /89   Pulse 78   Wt 161 lb 6.4 oz (73.2 kg)   LMP 11/06/2017   BMI 28.59 kg/m²     Wt Readings from Last 2 Encounters:   07/24/18 161 lb 6.4 oz (73.2 kg)   07/17/18 158 lb 12.8 oz (72 kg)       See ACOG flowsheet above    Physical Exam:  A physical exam was performed during this visit. No epig tenderness, DTR 1+, 2+ pitting edema LE and hands    Pelvic Exam:  A pelvic exam was  completed at this visit: 6/05/-8, cephalic     Assessment:     Patient Active Problem List   Diagnosis Code    Sickle cell trait ([facility] D57.3    34 yo S4Q5286 Z34.81    Vaginal discharge during pregnancy O26.899, N89.8    Anemia affecting pregnancy in second trimester O99.012       33 y.o. T2O6235 37w1d  -borderline BP, swelling of hands and feet, asymptomatic. Discussed RTO 1 week. If BP elevated will get off work. If develops GHTN/PreE, will induce labor. Precautions. Obstetrician: Jess Patrick MD      Plan:     - Prenatal labs: Reviewed by the physician  - Patient to follow up in: 1 week    Kick count reinforced. Pregnancy expectations were discussed and all questions were answered.        Electronically signed by Jess Patrick MD on 7/24/2018 at 2:48 PM

## 2018-07-31 ENCOUNTER — HOSPITAL ENCOUNTER (OUTPATIENT)
Dept: OBGYN CLINIC | Age: 28
Discharge: HOME | End: 2018-08-31
Admitting: OBSTETRICS & GYNECOLOGY

## 2018-07-31 VITALS
BODY MASS INDEX: 28.91 KG/M2 | WEIGHT: 163.2 LBS | SYSTOLIC BLOOD PRESSURE: 158 MMHG | DIASTOLIC BLOOD PRESSURE: 107 MMHG | HEART RATE: 87 BPM

## 2018-07-31 PROBLEM — O14.93 PREECLAMPSIA, THIRD TRIMESTER: Status: ACTIVE | Noted: 2018-07-31

## 2018-07-31 PROCEDURE — 0502F SUBSEQUENT PRENATAL CARE: CPT | Performed by: OBSTETRICS & GYNECOLOGY

## 2018-07-31 NOTE — PLAN OF CARE
Prenatal Clinic  Clinical Level of [address]    NAME: Davin Hernandes OF BIRTH:  1990 GENDER: female  MEDICAL RECORD NUMBER:  [id number]  DATE:  7/31/2018    Assessment   Points   No Assessment []   0   General Prenatal assessment (e.g. weight, vital signs, urine test). Completed OB Clinic navigator tabs, in partnership with the Registered Nurse. []   1   General Prenatal assessment (e.g. weight, vital signs, urine test). Completed OB Clinic navigator tabs, in partnership with the Registered Nurse. Set up tests or procedures (e.g. ultrasound, specimen swabs, induction). [x]   2   Full Prenatal assessment (e.g. weight, vitals signs, urine test) to include a full review of history. Completed OB Clinic navigator tabs, in partnership with the Registered Nurse. Set up tests or procedures (e.g. ultrasound, specimen swabs, induction). Or transfer to an outside facility, transfer to [facility] for further evaluation, admission to the hospital.  []   3       Ambulation Status   Status Definition Points   Independent Independently able to ambulate. Fully able (without any assistance) to get on/off exam table/chair. [x]   0   Minimal Physical Assistance Requires physical assistance of one person to ambulate and/or position patient to be examined. Includes necessary physical assistance to position lower extremities on/off stool. []   1   Moderate Physical Assistance Requires at least one staff member to physically assist patient in ambulating into treatment room, and on/off recliner chair. []   2   Full Assistance Requires assistance of at least two staff members to transfer patient into treatment room and/or on/off exam table/chair. \"Total Transfer\". []   3       Teaching Effort   Effort Definition Points   No Teaching  []   0   General The teaching will focus on visit schedule and laboratory tests. This education can be found in the Discharge Instructions.  [x]   1 Intermediate The teaching will focus on visit schedule and laboratory tests, plus additional topics such as health and lifestyle (e.g. kick counts, diet). This education can be found in the Discharge Instructions []   2   Complex New patient information packet given to the patient/caregiver and reviewed with the Registered Nurse.   []   3       Patient Discharge and Planning  Planning Definition Points   General Follow-up with routine assessment and planning. Discharge instructions and After Visit Summary given to patient/caregiver and reviewed with the Registered Nurse. Simple follow-up with routine assessment and planning.    []   1   Intermediate Follow-up with routine assessment and planning. Discharge instructions and After Visit Summary given to patient/caregiver and reviewed with the Registered Nurse. Contact with additional resources (e.g. , Physician, Lactation). May include filling out forms, writing letters, communication with insurance , FLMA forms, etc.   [x]   2   Complex Full, comprehensive assessment and planning which includes assistance for a hospital admission or transfer to a higher level of care facility.   []   3     Is this the Patient's First Visit to the Prenatal Clinic    No     Is this Patient Established @ [facility]  Yes             Clinical Level of Care      Points  0-3  Level 1 []     Points  4-6  Level 2 [x]     Points  7-8  Level 3 []     Points  9-10  Level 4 []     Points  11-12  Level 5 []       Electronically signed by Florentino Morgan RN on 7/31/2018 at 2:46 PM

## 2018-07-31 NOTE — PROGRESS NOTES
Prenatal [address], [address] [place] [address]      OB Follow-Up Visit Progress Note    Chief Complaint   Patient presents with    Routine Prenatal Visit        Subjective:     HISTORY OF PRESENT ILLNESS (HPI)    History of  Jason Pan is a 33 y.o., [id number] at 38w1d who presents to the clinic for her follow-up OB visit. Lindy Reaves  complains of contractions every 5 minutes since 0500 this morning. .  She does not complain of vaginal bleeding. She does not report leakage of fluid. She  does complain of contractions. She  does not complain of decreased fetal movement. PAST MEDICAL HISTORY        Diagnosis Date    Anemia     with last pregnancy    Miscarriage     Miscarriage     D and C         PAST SURGICAL HISTORY    Past Surgical History:   Procedure Laterality Date    DILATION AND CURETTAGE OF UTERUS      MAB       FAMILY HISTORY    Family History   Problem Relation Age of Onset    Diabetes Mother     Diabetes Maternal Grandmother     Diabetes Maternal Uncle        SOCIAL HISTORY    Social History   Substance Use Topics    Smoking status: Never Smoker    Smokeless tobacco: Never Used    Alcohol use No       OB HISTORY    Obstetric History       T1      L1     SAB1   TAB0   Ectopic0   Molar0   Multiple0   Live Births1       # Outcome Date GA Lbr Rafael/2nd Weight Sex Delivery Anes PTL Lv   4 Current            3  17 36w6d  5 lb 9.2 oz (2.53 kg) F Vag-Spont EPI Y JUSTINE      Name: Issa Garcia:  7                Apgar5: 9   2 Term 14 38w6d  7 lb 7 oz (3.374 kg)  Vag-Spont         Apgar1:  7                Apgar5: 9   1 2013                  ALLERGIES    No Known Allergies    MEDICATIONS    Prior to Admission medications    Medication Sig Start Date End Date Taking?  Authorizing Provider   ferrous sulfate (FE TABS) 325 (65 Fe) MG EC tablet Take 1 tablet by mouth daily (with

## 2018-07-31 NOTE — PROGRESS NOTES
Pt c/o ctx since 0530 approx 5-7 minutes, rating pain at 6-7 on pain scale. Denies LOF/vaginal bleeding. Her BP is elevated in office.

## 2018-08-01 ENCOUNTER — HOSPITAL ENCOUNTER (OUTPATIENT)
Dept: OBGYN CLINIC | Age: 28
Discharge: HOME | End: 2018-08-01
Attending: OBSTETRICS & GYNECOLOGY | Admitting: OBSTETRICS & GYNECOLOGY

## 2018-08-02 PROBLEM — O14.93 PREECLAMPSIA, THIRD TRIMESTER: Status: ACTIVE | Noted: 2018-08-02

## 2018-08-07 ENCOUNTER — HOSPITAL ENCOUNTER (OUTPATIENT)
Dept: OBGYN CLINIC | Age: 28
Discharge: HOME | End: 2018-08-08
Admitting: OBSTETRICS & GYNECOLOGY

## 2018-08-07 VITALS
DIASTOLIC BLOOD PRESSURE: 99 MMHG | HEART RATE: 87 BPM | SYSTOLIC BLOOD PRESSURE: 143 MMHG | WEIGHT: 139.2 LBS | BODY MASS INDEX: 24.66 KG/M2

## 2018-08-07 DIAGNOSIS — O99.012 ANEMIA AFFECTING PREGNANCY IN SECOND TRIMESTER: ICD-10-CM

## 2018-08-07 DIAGNOSIS — O14.93 PREECLAMPSIA, THIRD TRIMESTER: ICD-10-CM

## 2018-08-10 ENCOUNTER — HOSPITAL ENCOUNTER (OUTPATIENT)
Dept: OBGYN CLINIC | Age: 28
Discharge: HOME | End: 2018-08-11
Admitting: OBSTETRICS & GYNECOLOGY

## 2018-08-10 VITALS
SYSTOLIC BLOOD PRESSURE: 116 MMHG | WEIGHT: 135 LBS | DIASTOLIC BLOOD PRESSURE: 83 MMHG | HEART RATE: 96 BPM | BODY MASS INDEX: 23.91 KG/M2

## 2018-09-01 ENCOUNTER — HOSPITAL ENCOUNTER (OUTPATIENT)
Dept: OBGYN CLINIC | Age: 28
Discharge: HOME | End: 2018-09-01
Attending: OBSTETRICS & GYNECOLOGY | Admitting: OBSTETRICS & GYNECOLOGY

## 2018-09-11 ENCOUNTER — HOSPITAL ENCOUNTER (OUTPATIENT)
Dept: OBGYN CLINIC | Age: 28
Discharge: HOME | End: 2018-09-12
Admitting: OBSTETRICS & GYNECOLOGY

## 2018-09-11 VITALS — HEART RATE: 84 BPM | SYSTOLIC BLOOD PRESSURE: 124 MMHG | DIASTOLIC BLOOD PRESSURE: 82 MMHG

## 2018-09-11 DIAGNOSIS — O14.93 PREECLAMPSIA, THIRD TRIMESTER: ICD-10-CM

## 2018-09-11 DIAGNOSIS — O99.012 ANEMIA AFFECTING PREGNANCY IN SECOND TRIMESTER: ICD-10-CM

## 2018-09-11 PROCEDURE — 0503F POSTPARTUM CARE VISIT: CPT | Performed by: OBSTETRICS & GYNECOLOGY

## 2018-09-11 NOTE — PROGRESS NOTES
11/26/14 38w6d  7 lb 7 oz (3.374 kg)  Vag-Spont         Apgar1:  7                Apgar5: 9   1 SAB 2013                  ALLERGIES    No Known Allergies    MEDICATIONS    Current Outpatient Prescriptions on File Prior to Encounter   Medication Sig Dispense Refill    ferrous sulfate (FE TABS) 325 (65 Fe) MG EC tablet Take 1 tablet by mouth daily (with breakfast) 90 tablet 3    Prenatal Multivit-Min-Fe-FA (PRENATAL VITAMINS) 0.8 MG TABS Take 1 tablet by mouth daily 30 tablet 5     No current facility-administered medications on file prior to encounter. REVIEW OF SYSTEMS    Pertinent items are noted in HPI. Objective:      /82   Pulse 84   LMP 11/06/2017     Wt Readings from Last 2 Encounters:   07/31/18 163 lb 3.2 oz (74 kg)   08/10/18 135 lb (61.2 kg)       Appearance/Psychiatric: alert and oriented X3  Constitutional: Appears well, no distress  Cardiovascular: She does not have edema. Respiratory effort: Respirations  are not easy with no stridor. Gastrointestinal: Abdomen  is soft and  is non tender   Breasts: engorged with no signs of mastitis. Genitourinary:  External Genitalia:  normal genitalia, no erythema, no discharge  Vagina:  normal vagina. No discharge, exudate, lesions, erythema  Bladder: non tender to palpitation. Urethra:  Normal appearing urethra with no masses, tenderness or lesions  Uterus:  normal size, non-tender   Adnexa: Normal, no palpable masses     Pelvic Exam:  A pelvic exam was not completed during this visit. Verbal consent obtained for pelvic exam:  yes  Response to pelvic exam:  Well tolerated by patient.     Last PAP smear:  normal  Date of last Pap smear: 9/2016    Assessment:     Patient Active Problem List   Diagnosis Code    [facility]    34 yo S4Y0500 Z34.81    Vaginal discharge during pregnancy O26.899, N89.8    Anemia affecting pregnancy in second trimester O99.012    Preeclampsia, third trimester O14.93    Preeclampsia, third trimester O14.93     (spontaneous vaginal delivery) O80       30 y.o. D3O2089      Family Planning: The patient is not sexually active. Full counseling on the many choices of family planning methods including IUD was provided, and all questions answered. Compliance is strongly emphasized. Plan: To see Dr Brennan Kuo for gyn care     Caprice Tobar was discharged from the Prenatal Clinic.                  Electronically signed by Daniel Rojas MD on 2018 at 3:06 PM

## 2018-09-11 NOTE — CARE COORDINATION
Therapist met with patient and administered the [place] Assessment. Pt scored a 2. She explained that she suffers from depression and has been seeing her Therapist. Pt stated that she has been sleeping ok and the baby is great! Pt went on to say that she would like to attend the Depression Groups because it would feel good to be around others experiencing the same issues as she has. Therapist gave pt the information on the group and welcomed her to join. No further follow up needed at this time.       DELFIN Galindo

## 2019-03-21 PROBLEM — O26.899 VAGINAL DISCHARGE DURING PREGNANCY: Status: RESOLVED | Noted: 2018-01-26 | Resolved: 2019-03-21

## 2019-03-21 PROBLEM — N89.8 VAGINAL DISCHARGE DURING PREGNANCY: Status: RESOLVED | Noted: 2018-01-26 | Resolved: 2019-03-21

## 2019-03-21 PROBLEM — O14.93 PREECLAMPSIA, THIRD TRIMESTER: Status: RESOLVED | Noted: 2018-07-31 | Resolved: 2019-03-21

## 2019-03-28 ENCOUNTER — HOSPITAL ENCOUNTER (OUTPATIENT)
Dept: OBGYN CLINIC | Age: 29
Discharge: HOME | End: 2019-03-28
Payer: COMMERCIAL

## 2019-03-28 VITALS
HEART RATE: 87 BPM | DIASTOLIC BLOOD PRESSURE: 77 MMHG | BODY MASS INDEX: 24.16 KG/M2 | SYSTOLIC BLOOD PRESSURE: 121 MMHG | WEIGHT: 136.4 LBS

## 2019-03-28 DIAGNOSIS — O14.93 PREECLAMPSIA, THIRD TRIMESTER: ICD-10-CM

## 2019-03-28 DIAGNOSIS — Z34.81 NORMAL PREGNANCY IN MULTIGRAVIDA IN FIRST TRIMESTER: Primary | ICD-10-CM

## 2019-03-28 DIAGNOSIS — Z87.59 HISTORY OF PRE-ECLAMPSIA: ICD-10-CM

## 2019-03-28 DIAGNOSIS — Z34.81 NORMAL PREGNANCY IN MULTIGRAVIDA IN FIRST TRIMESTER: ICD-10-CM

## 2019-03-28 LAB
ABO/RH: NORMAL
ALT SERPL-CCNC: 17 U/L (ref 10–40)
AMPHETAMINE SCREEN, URINE: NORMAL
ANTIBODY SCREEN: NORMAL
AST SERPL-CCNC: 13 U/L (ref 15–37)
BARBITURATE SCREEN URINE: NORMAL
BENZODIAZEPINE SCREEN, URINE: NORMAL
BILIRUBIN URINE: NEGATIVE MG/DL
BLOOD, URINE: ABNORMAL
BUPRENORPHINE URINE: NORMAL
CANNABINOID SCREEN URINE: NORMAL
CLARITY: CLEAR
COCAINE METABOLITE SCREEN URINE: NORMAL
COLOR: YELLOW
GLUCOSE URINE: NEGATIVE MG/DL
KETONES, URINE: NEGATIVE MG/DL
LEUKOCYTE ESTERASE, URINE: NEGATIVE
Lab: NORMAL
METHADONE SCREEN, URINE: NORMAL
NITRITE, URINE: NEGATIVE
OPIATE SCREEN URINE: NORMAL
OXYCODONE URINE: NORMAL
PH UA: 5
PH UA: 6.5 (ref 5–8)
PHENCYCLIDINE SCREEN URINE: NORMAL
PROPOXYPHENE SCREEN: NORMAL
PROTEIN UA: NEGATIVE MG/DL
SPECIFIC GRAVITY UA: 1.02 (ref 1–1.03)
URIC ACID, SERUM: 4.1 MG/DL (ref 2.6–6)
UROBILINOGEN, URINE: 0.2 E.U./DL

## 2019-03-28 PROCEDURE — 81003 URINALYSIS AUTO W/O SCOPE: CPT

## 2019-03-28 PROCEDURE — 0502F SUBSEQUENT PRENATAL CARE: CPT | Performed by: OBSTETRICS & GYNECOLOGY

## 2019-03-28 PROCEDURE — 80307 DRUG TEST PRSMV CHEM ANLYZR: CPT

## 2019-03-28 PROCEDURE — 99213 OFFICE O/P EST LOW 20 MIN: CPT

## 2019-03-28 PROCEDURE — 87086 URINE CULTURE/COLONY COUNT: CPT

## 2019-03-29 PROBLEM — Z34.81 NORMAL PREGNANCY IN MULTIGRAVIDA IN FIRST TRIMESTER: Status: RESOLVED | Noted: 2018-01-26 | Resolved: 2019-03-29

## 2019-03-29 PROBLEM — Z87.59 HISTORY OF PRE-ECLAMPSIA: Status: ACTIVE | Noted: 2019-03-29

## 2019-03-29 PROBLEM — O99.012 ANEMIA AFFECTING PREGNANCY IN SECOND TRIMESTER: Status: RESOLVED | Noted: 2018-05-04 | Resolved: 2019-03-29

## 2019-03-29 PROBLEM — O14.93 PREECLAMPSIA, THIRD TRIMESTER: Status: RESOLVED | Noted: 2018-08-02 | Resolved: 2019-03-29

## 2019-03-29 PROBLEM — Z34.90 PREGNANCY: Status: ACTIVE | Noted: 2019-03-29

## 2019-03-29 LAB
BASOPHILS ABSOLUTE: 0 K/UL (ref 0–0.2)
BASOPHILS RELATIVE PERCENT: 0.3 %
EOSINOPHILS ABSOLUTE: 0.1 K/UL (ref 0–0.6)
EOSINOPHILS RELATIVE PERCENT: 1.6 %
HCT VFR BLD CALC: 29.5 % (ref 36–48)
HEMATOLOGY PATH CONSULT: NO
HEMOGLOBIN: 9.6 G/DL (ref 12–16)
HEPATITIS B SURFACE ANTIGEN INTERPRETATION: ABNORMAL
HIV AG/AB: NORMAL
HIV ANTIGEN: NORMAL
HIV-1 ANTIBODY: NORMAL
HIV-2 AB: NORMAL
LYMPHOCYTES ABSOLUTE: 1 K/UL (ref 1–5.1)
LYMPHOCYTES RELATIVE PERCENT: 17.7 %
MCH RBC QN AUTO: 21.8 PG (ref 26–34)
MCHC RBC AUTO-ENTMCNC: 32.5 G/DL (ref 31–36)
MCV RBC AUTO: 67.1 FL (ref 80–100)
MONOCYTES ABSOLUTE: 0.4 K/UL (ref 0–1.3)
MONOCYTES RELATIVE PERCENT: 7.8 %
NEUTROPHILS ABSOLUTE: 4.1 K/UL (ref 1.7–7.7)
NEUTROPHILS RELATIVE PERCENT: 72.6 %
PDW BLD-RTO: 17.5 % (ref 12.4–15.4)
PLATELET # BLD: 309 K/UL (ref 135–450)
PMV BLD AUTO: 7.8 FL (ref 5–10.5)
RBC # BLD: 4.39 M/UL (ref 4–5.2)
RUBELLA ANTIBODY IGG: >500 IU/ML
TOTAL SYPHILLIS IGG/IGM: ABNORMAL
WBC # BLD: 5.7 K/UL (ref 4–11)

## 2019-03-30 LAB — URINE CULTURE, ROUTINE: NORMAL

## 2019-04-01 ENCOUNTER — TELEPHONE (OUTPATIENT)
Dept: OBGYN CLINIC | Age: 29
End: 2019-04-01

## 2019-04-25 ENCOUNTER — HOSPITAL ENCOUNTER (OUTPATIENT)
Dept: OBGYN CLINIC | Age: 29
Discharge: HOME | End: 2019-04-25
Payer: COMMERCIAL

## 2019-04-25 VITALS
DIASTOLIC BLOOD PRESSURE: 69 MMHG | BODY MASS INDEX: 24.64 KG/M2 | HEART RATE: 96 BPM | SYSTOLIC BLOOD PRESSURE: 110 MMHG | WEIGHT: 139.13 LBS

## 2019-04-25 DIAGNOSIS — Z3A.14 14 WEEKS GESTATION OF PREGNANCY: ICD-10-CM

## 2019-04-25 LAB
BILIRUBIN URINE: NEGATIVE MG/DL
BLOOD, URINE: ABNORMAL
CLARITY: CLEAR
COLOR: YELLOW
GLUCOSE URINE: NEGATIVE MG/DL
KETONES, URINE: NEGATIVE MG/DL
LEUKOCYTE ESTERASE, URINE: ABNORMAL
NITRITE, URINE: NEGATIVE
PH UA: 7 (ref 5–8)
PROTEIN UA: ABNORMAL MG/DL
SPECIFIC GRAVITY UA: 1.02 (ref 1–1.03)
UROBILINOGEN, URINE: 0.2 E.U./DL

## 2019-04-25 PROCEDURE — 0502F SUBSEQUENT PRENATAL CARE: CPT | Performed by: OBSTETRICS & GYNECOLOGY

## 2019-04-25 PROCEDURE — 99212 OFFICE O/P EST SF 10 MIN: CPT

## 2019-04-25 PROCEDURE — 81003 URINALYSIS AUTO W/O SCOPE: CPT

## 2019-04-25 NOTE — PROGRESS NOTES
Current            5 Term 18 38w1d  6 lb 11 oz (3.033 kg) F  EPI N JUSTINE      Name: Joslyn Xavier   4  17 36w6d  5 lb 9.2 oz (2.53 kg) F Vag-Spont EPI Y JUSTINE      Name: Nas Topete: 7  Apgar5: 9   3 Term 14 38w6d  7 lb 7 oz (3.374 kg)  Vag-Spont   JUSTINE      Apgar1: 7  Apgar5: 9   2 SAB            1                 ALLERGIES    No Known Allergies    MEDICATIONS    Prior to Admission medications    Not on File       REVIEW OF SYSTEMS    Pertinent items are noted in HPI. Objective:      /69   Pulse 96   Wt 139 lb 2 oz (63.1 kg)   LMP 2019   BMI 24.64 kg/m²     Wt Readings from Last 2 Encounters:   19 139 lb 2 oz (63.1 kg)   19 136 lb 6.4 oz (61.9 kg)       See ACOG flowsheet above        Assessment:     Patient Active Problem List   Diagnosis Code    Anemia affecting pregnancy in second trimester O99.012    33 y.o. R5K5005 Z34.90    History of pre-eclampsia Z87.59       33 y.o. E3O6458 13w1d    -left sided sciatica, referred to PT   -lose stools, 2-3 times a day for last 2 months, discussed that can be normal, but to see PCP if concerned to stool smaples work up  Obstetrician: Angel Velasquez MD      Plan:     - Prenatal labs: Reviewed by the physician  - Patient to follow up in: 4 weeks    Pregnancy expectations were discussed and all questions were answered. 70 % of time was spent discussing findings, management, and treatment options.             Electronically signed by Angel Velasquez MD on 2019 at 12:19 PM

## 2019-04-25 NOTE — PLAN OF CARE
Prenatal Clinic  Clinical Level of [address]    NAME: Nelson  OF BIRTH:  1990 GENDER: female  MEDICAL RECORD NUMBER:  [id number]  DATE:  4/25/2019    Assessment   Points   No Assessment []   0   General Prenatal assessment (e.g. weight, vital signs, urine test). Completed OB Clinic navigator tabs, in partnership with the Registered Nurse. [x]   1   General Prenatal assessment (e.g. weight, vital signs, urine test). Completed OB Clinic navigator tabs, in partnership with the Registered Nurse. Set up tests or procedures (e.g. ultrasound, specimen swabs, induction). []   2   Full Prenatal assessment (e.g. weight, vitals signs, urine test) to include a full review of history. Completed OB Clinic navigator tabs, in partnership with the Registered Nurse. Set up tests or procedures (e.g. ultrasound, specimen swabs, induction). Or transfer to an outside facility, transfer to [facility] for further evaluation, admission to the hospital.  []   3       Ambulation Status   Status Definition Points   Independent Independently able to ambulate. Fully able (without any assistance) to get on/off exam table/chair. [x]   0   Minimal Physical Assistance Requires physical assistance of one person to ambulate and/or position patient to be examined. Includes necessary physical assistance to position lower extremities on/off stool. []   1   Moderate Physical Assistance Requires at least one staff member to physically assist patient in ambulating into treatment room, and on/off recliner chair. []   2   Full Assistance Requires assistance of at least two staff members to transfer patient into treatment room and/or on/off exam table/chair. \"Total Transfer\". []   3       Teaching Effort   Effort Definition Points   No Teaching  []   0   General The teaching will focus on visit schedule and laboratory tests. This education can be found in the Discharge Instructions.  [x]   1 Intermediate The teaching will focus on visit schedule and laboratory tests, plus additional topics such as health and lifestyle (e.g. kick counts, diet). This education can be found in the Discharge Instructions []   2   Complex New patient information packet given to the patient/caregiver and reviewed with the Registered Nurse.   []   3       Patient Discharge and Planning  Planning Definition Points   General Follow-up with routine assessment and planning. Discharge instructions and After Visit Summary given to patient/caregiver and reviewed with the Registered Nurse. Simple follow-up with routine assessment and planning.    []   1   Intermediate Follow-up with routine assessment and planning. Discharge instructions and After Visit Summary given to patient/caregiver and reviewed with the Registered Nurse. Contact with additional resources (e.g. , Physician, Lactation). May include filling out forms, writing letters, communication with insurance , FLMA forms, etc.   [x]   2   Complex Full, comprehensive assessment and planning which includes assistance for a hospital admission or transfer to a higher level of care facility.   []   3     Is this the Patient's First Visit to the Prenatal Clinic    No     Is this Patient Established @ [facility]  Yes             Clinical Level of Care      Points  0-3  Level 1 []     Points  4-6  Level 2 [x]     Points  7-8  Level 3 []     Points  9-10  Level 4 []     Points  11-12  Level 5 []       Electronically signed by Mark Dia RN on 4/25/2019 at 2:58 PM

## 2019-04-25 NOTE — CARE COORDINATION
Therapist met with pt she stated that she is doing fine. The fact that she is pregnant has settled in and she is ok with it. Pt stated that she is just trying to find a good schedule for the other 3 kids so that she is not so tired. Pt stated that she has not been as depressed and feels so much better when she talks. We went over some ways to better utilize her time with and without the children but to ensure she is still having some \"ME TIME\". Therapist will see pt next visit.        DELFIN Galindo

## 2019-04-26 LAB
24HR URINE VOLUME (ML): 2050 ML
CREATININE 24 HOUR URINE: 2 G/24HR (ref 0.6–1.5)
PROTEIN 24 HOUR URINE: 0.29 G/24HR

## 2019-05-01 NOTE — FLOWSHEET NOTE
Lindy called this RN back stating she has an appointment on 5/2 at the [facility] at 1:45pm to have possible yeast infection and questionable bump by her rectum evaluated. Asked patient to call me and let me know what the clinic says. Patient states she will call.

## 2019-05-17 ENCOUNTER — HOSPITAL ENCOUNTER (OUTPATIENT)
Dept: OBGYN CLINIC | Age: 29
Discharge: HOME | End: 2019-05-17
Payer: COMMERCIAL

## 2019-05-17 ENCOUNTER — TELEPHONE (OUTPATIENT)
Dept: OBGYN CLINIC | Age: 29
End: 2019-05-17

## 2019-05-17 VITALS
SYSTOLIC BLOOD PRESSURE: 111 MMHG | BODY MASS INDEX: 25.91 KG/M2 | DIASTOLIC BLOOD PRESSURE: 67 MMHG | HEART RATE: 120 BPM | WEIGHT: 146.25 LBS

## 2019-05-17 DIAGNOSIS — Z3A.17 17 WEEKS GESTATION OF PREGNANCY: ICD-10-CM

## 2019-05-17 DIAGNOSIS — Z87.59 HISTORY OF PRE-ECLAMPSIA: ICD-10-CM

## 2019-05-17 LAB
BILIRUBIN URINE: NEGATIVE MG/DL
BLOOD, URINE: ABNORMAL
CLARITY: CLEAR
COLOR: YELLOW
GLUCOSE URINE: NEGATIVE MG/DL
KETONES, URINE: NEGATIVE MG/DL
LEUKOCYTE ESTERASE, URINE: ABNORMAL
NITRITE, URINE: NEGATIVE
PH UA: 7 (ref 5–8)
PROTEIN UA: NEGATIVE MG/DL
SPECIFIC GRAVITY UA: 1.02 (ref 1–1.03)
UROBILINOGEN, URINE: 0.2 E.U./DL

## 2019-05-17 PROCEDURE — 87510 GARDNER VAG DNA DIR PROBE: CPT

## 2019-05-17 PROCEDURE — 81003 URINALYSIS AUTO W/O SCOPE: CPT

## 2019-05-17 PROCEDURE — 99213 OFFICE O/P EST LOW 20 MIN: CPT | Performed by: OBSTETRICS & GYNECOLOGY

## 2019-05-17 PROCEDURE — 87086 URINE CULTURE/COLONY COUNT: CPT

## 2019-05-17 PROCEDURE — 87077 CULTURE AEROBIC IDENTIFY: CPT

## 2019-05-17 PROCEDURE — 87660 TRICHOMONAS VAGIN DIR PROBE: CPT

## 2019-05-17 PROCEDURE — 87186 SC STD MICRODIL/AGAR DIL: CPT

## 2019-05-17 PROCEDURE — 87480 CANDIDA DNA DIR PROBE: CPT

## 2019-05-17 PROCEDURE — 99212 OFFICE O/P EST SF 10 MIN: CPT

## 2019-05-17 NOTE — CARE COORDINATION
Therapist met with pt and she stated that she has been meditating and it has been working for her. Pt stated that she does not allow a lot to bother her and that she feels so much better about her pregnancy now. Pt actually looked a lot better today. Pt stated that everyone has seen a difference in her appearance and her attitude on life. Pt had her 2 youngest kids with her and she handled them with patience and seemed to be happy with how things are going. Therapist will see pt next appointment.     Martineence DELFIN Mcgrath

## 2019-05-17 NOTE — PROGRESS NOTES
Prenatal [address] [facility]  [address]      OB Follow-Up Visit Progress Note    Chief Complaint   Patient presents with    Routine Prenatal Visit     Denies complaints. Had a Rx for Terazol 3 and used it. Patient states she feels like she continues to have an infedtion since she continues to have vaginal discharge which is white, thick, irritatiing, slight odor. Subjective:     HISTORY OF PRESENT ILLNESS (HPI)    History of  Jeff Quiroz is a 33 y.o., [id number] at 16w2d who presents to the clinic for her follow-up OB visit. Lindy Reaves  c/o continued vaginal discharge, N/V. She stopped the B6/Unisom because it seemed to make things worse. .  She does not complain of vaginal bleeding. She does not report leakage of fluid. She  does not complain of contractions. She  does not complain of decreased fetal movement.     PAST MEDICAL HISTORY        Diagnosis Date    Anemia     with last pregnancy    Miscarriage     Miscarriage     D and C     Preeclampsia, third trimester 2018        PAST SURGICAL HISTORY    Past Surgical History:   Procedure Laterality Date    DILATION AND CURETTAGE OF UTERUS      MAB       FAMILY HISTORY    Family History   Problem Relation Age of Onset    Diabetes Mother     Diabetes Maternal Grandmother     High Blood Pressure Maternal Grandmother     Diabetes Maternal Uncle        SOCIAL HISTORY    Social History     Tobacco Use    Smoking status: Never Smoker    Smokeless tobacco: Never Used   Substance Use Topics    Alcohol use: No    Drug use: No       OB HISTORY    OB History    Para Term  AB Living   6 3 2 1 1 3   SAB TAB Ectopic Molar Multiple Live Births   1 0 0 0 0 3      # Outcome Date GA Lbr Rafael/2nd Weight Sex Delivery Anes PTL Lv   6 Current            5 Term 18 38w1d  6 lb 11 oz (3.033 kg) F  EPI N JUSTINE      Name: Julián López   4  17 36w6d  5 lb 9.2 oz (2.53 kg) F Vag-Spont EPI Y JUSTINE      Name: Leticia Miners: 7  Apgar5: 9   3 Term 14 38w6d  7 lb 7 oz (3.374 kg)  Vag-Spont   JUSTINE      Apgar1: 7  Apgar5: 9   2 2013           1                 ALLERGIES    No Known Allergies    MEDICATIONS    Prior to Admission medications    Not on File       REVIEW OF SYSTEMS    Pertinent items are noted in HPI. Objective:      /67   Pulse 120   Wt 146 lb 4 oz (66.3 kg)   LMP 2019   BMI 25.91 kg/m²     Wt Readings from Last 2 Encounters:   19 146 lb 4 oz (66.3 kg)   19 139 lb 2 oz (63.1 kg)       See ACOG flowsheet above    Physical Exam:  A physical exam was performed during this visit. Neurologic/Psychiatric: alert and oriented X3  Constitutional: alert and oriented to person, place and time, well-developed and well-nourished, in no acute distress  Gastrointestinal: Abdomen  is soft and  is non tender and gravid  Vagina: Normal vagina. Increased white discharge present. Bladder: non tender to palpitation. Uterus:  Non tender. Pelvic Exam:  A pelvic exam was completed during this visit. Verbal consent obtained for pelvic exam:  yes  Cervix: closed/thick/high. Specimens obtained: Affirm. Response to pelvic exam:  Well tolerated by patient. Assessment:     Patient Active Problem List   Diagnosis Code    Anemia affecting pregnancy in second trimester O99.012    33 y.o. A6K0072 Z34.90    History of pre-eclampsia Z87.59       33 y.o. M2Z3242 16w2d    Obstetrician: Indiana Velasquez MD      Plan:     - Prenatal labs: Reviewed by the physician  - h/o preE: baseline labs wnl  - N/V: reviewed management  - vaginal discharge s/p tx with Terazol #3: Affirm obtained and sent. Will treat based on results. - Patient to follow up in:  4 weeks      Kick count N/A. Pregnancy expectations were discussed and all questions were answered.     50% of time was spent discussing findings, management, and treatment options.             Electronically signed by Geo Magallanes MD on 5/17/2019 at 12:44 PM

## 2019-05-17 NOTE — PLAN OF CARE
Prenatal Clinic  Clinical Level of [address].    NAME: Allen Jimenez OF BIRTH:  1990 GENDER: female  MEDICAL RECORD NUMBER:  [id number]  DATE:  5/17/2019    Assessment   Points   No Assessment []   0   General Prenatal assessment (e.g. weight, vital signs, urine test). Completed OB Clinic navigator tabs, in partnership with the Registered Nurse. [x]   1   General Prenatal assessment (e.g. weight, vital signs, urine test). Completed OB Clinic navigator tabs, in partnership with the Registered Nurse. Set up tests or procedures (e.g. ultrasound, specimen swabs, induction). []   2   Full Prenatal assessment (e.g. weight, vitals signs, urine test) to include a full review of history. Completed OB Clinic navigator tabs, in partnership with the Registered Nurse. Set up tests or procedures (e.g. ultrasound, specimen swabs, induction). Or transfer to an outside facility, transfer to [facility] for further evaluation, admission to the hospital.  []   3       Ambulation Status   Status Definition Points   Independent Independently able to ambulate. Fully able (without any assistance) to get on/off exam table/chair. [x]   0   Minimal Physical Assistance Requires physical assistance of one person to ambulate and/or position patient to be examined. Includes necessary physical assistance to position lower extremities on/off stool. []   1   Moderate Physical Assistance Requires at least one staff member to physically assist patient in ambulating into treatment room, and on/off recliner chair. []   2   Full Assistance Requires assistance of at least two staff members to transfer patient into treatment room and/or on/off exam table/chair. \"Total Transfer\". []   3       Teaching Effort   Effort Definition Points   No Teaching  []   0   General The teaching will focus on visit schedule and laboratory tests. This education can be found in the Discharge Instructions.  [x]   1 Intermediate The teaching will focus on visit schedule and laboratory tests, plus additional topics such as health and lifestyle (e.g. kick counts, diet). This education can be found in the Discharge Instructions []   2   Complex New patient information packet given to the patient/caregiver and reviewed with the Registered Nurse.   []   3       Patient Discharge and Planning  Planning Definition Points   General Follow-up with routine assessment and planning. Discharge instructions and After Visit Summary given to patient/caregiver and reviewed with the Registered Nurse. Simple follow-up with routine assessment and planning.    []   1   Intermediate Follow-up with routine assessment and planning. Discharge instructions and After Visit Summary given to patient/caregiver and reviewed with the Registered Nurse. Contact with additional resources (e.g. , Physician, Lactation). May include filling out forms, writing letters, communication with insurance , FLMA forms, etc.   [x]   2   Complex Full, comprehensive assessment and planning which includes assistance for a hospital admission or transfer to a higher level of care facility.   []   3     Is this the Patient's First Visit to the Prenatal Clinic    No     Is this Patient Established @ this [facility] AT [place]  Yes             Clinical Level of Care      Points  0-3  Level 1 []     Points  4-6  Level 2 [x]     Points  7-8  Level 3 []     Points  9-10  Level 4 []     Points  11-12  Level 5 []       Electronically signed by Jason Rabago RN on 5/17/2019 at 1:02 PM

## 2019-05-17 NOTE — CARE COORDINATION
Prenatal Care Began:  OB clinic    Address and [place]: see facesheet     Phone: see facesheet      Marital Status: Single__x__ Married____Separated____Widowed____  Living situation:[place]   How many Children do you have? 3  Name: Filomena Riggins    Age:4  Name:Jose R Giordano     Age:3  Name: Byron Gomes    Age:9 monthes      In Whose Custody:patient's   Children Protective Services Involvement: denies _  What are your thoughts about being pregnant?accepted it    Preparation for infant arrival:has some concerns.      Childbirth Classes:yes   Parenting Classes:yes   Domestic abuse:denies  Physical Abuse:denies  Sexual Abuse:denies  Substance Abuse:denies  History of Depression:goes to [facility] once a week (EPDS given 3/30)  Other [address] (parttime ([employer] hearing screen)     WIC_x_ Medicaid_x_ Food Stamps_x_ Cash Assistance__  Support system:ok    Father of Inga Murdock   Age:37       Occupation:self employed   Emotional Support:yes                                  Financial Support:yes  Any other Children:3   Attitude toward Pregnancy:happy   Relationship with Father of Baby:co parent     Patient Concerns/plans for self and infant: no  Referrals offered: Syed Hamilton  Follow up needed: yes  Electronically signed by Belgica Goldberg on 5/17/2019 at 1:04 PM

## 2019-05-18 LAB
CANDIDA SPECIES, DNA PROBE: ABNORMAL
GARDNERELLA VAGINALIS, DNA PROBE: ABNORMAL
TRICHOMONAS VAGINALIS DNA: ABNORMAL

## 2019-05-20 ENCOUNTER — TELEPHONE (OUTPATIENT)
Dept: OBGYN CLINIC | Age: 29
End: 2019-05-20

## 2019-05-20 PROBLEM — O99.820 GBS (GROUP B STREPTOCOCCUS CARRIER), +RV CULTURE, CURRENTLY PREGNANT: Status: ACTIVE | Noted: 2019-05-20

## 2019-05-20 LAB
ORGANISM: ABNORMAL
ORGANISM: ABNORMAL
URINE CULTURE, ROUTINE: ABNORMAL

## 2019-06-04 ENCOUNTER — TELEPHONE (OUTPATIENT)
Dept: OBGYN CLINIC | Age: 29
End: 2019-06-04

## 2019-06-04 NOTE — TELEPHONE ENCOUNTER
Pt called clinic with C/O continued s/s yeast infection, despite completing meds prescribed. RN Navigator spoke with Dr. Abeba Choi. Verbal order for Terconazole 0.8%, 1 tube, apply vaginally, at night x3 doses. No refills. Pt informed to  today. RX called to pt pharmacy on record.

## 2019-06-10 ENCOUNTER — TELEPHONE (OUTPATIENT)
Dept: OBGYN CLINIC | Age: 29
End: 2019-06-10

## 2019-06-10 ENCOUNTER — HOSPITAL ENCOUNTER (EMERGENCY)
Age: 29
Discharge: HOME | End: 2019-06-11
Attending: EMERGENCY MEDICINE
Payer: COMMERCIAL

## 2019-06-10 VITALS
HEART RATE: 92 BPM | RESPIRATION RATE: 18 BRPM | HEIGHT: 63 IN | TEMPERATURE: 98.5 F | SYSTOLIC BLOOD PRESSURE: 114 MMHG | OXYGEN SATURATION: 100 % | WEIGHT: 149.47 LBS | BODY MASS INDEX: 26.48 KG/M2 | DIASTOLIC BLOOD PRESSURE: 73 MMHG

## 2019-06-10 DIAGNOSIS — R10.9 ABDOMINAL PAIN DURING PREGNANCY IN SECOND TRIMESTER: Primary | ICD-10-CM

## 2019-06-10 DIAGNOSIS — B96.89 BACTERIAL VAGINOSIS: ICD-10-CM

## 2019-06-10 DIAGNOSIS — O26.892 ABDOMINAL PAIN DURING PREGNANCY IN SECOND TRIMESTER: Primary | ICD-10-CM

## 2019-06-10 DIAGNOSIS — N76.0 BACTERIAL VAGINOSIS: ICD-10-CM

## 2019-06-10 LAB
BACTERIA WET PREP: ABNORMAL
BACTERIA: ABNORMAL /HPF
BILIRUBIN URINE: NEGATIVE
BLOOD, URINE: ABNORMAL
CLARITY: ABNORMAL
CLUE CELLS: ABNORMAL
COLOR: YELLOW
EPITHELIAL CELLS WET PREP: ABNORMAL
EPITHELIAL CELLS, UA: 8 /HPF (ref 0–5)
GLUCOSE URINE: NEGATIVE MG/DL
HCG(URINE) PREGNANCY TEST: POSITIVE
HYALINE CASTS: 0 /LPF (ref 0–8)
KETONES, URINE: NEGATIVE MG/DL
LEUKOCYTE ESTERASE, URINE: ABNORMAL
MICROSCOPIC EXAMINATION: YES
NITRITE, URINE: NEGATIVE
PH UA: 6.5 (ref 5–8)
PROTEIN UA: NEGATIVE MG/DL
RBC UA: 0 /HPF (ref 0–4)
RBC WET PREP: ABNORMAL
SOURCE WET PREP: ABNORMAL
SPECIFIC GRAVITY UA: 1.01 (ref 1–1.03)
TRICHOMONAS PREP: ABNORMAL
URINE REFLEX TO CULTURE: YES
URINE TYPE: ABNORMAL
UROBILINOGEN, URINE: 0.2 E.U./DL
WBC UA: 2 /HPF (ref 0–5)
WBC WET PREP: ABNORMAL
YEAST WET PREP: ABNORMAL

## 2019-06-10 PROCEDURE — 81001 URINALYSIS AUTO W/SCOPE: CPT

## 2019-06-10 PROCEDURE — 99284 EMERGENCY DEPT VISIT MOD MDM: CPT

## 2019-06-10 PROCEDURE — 84703 CHORIONIC GONADOTROPIN ASSAY: CPT

## 2019-06-10 PROCEDURE — 6370000000 HC RX 637 (ALT 250 FOR IP): Performed by: PHYSICIAN ASSISTANT

## 2019-06-10 PROCEDURE — 87491 CHLMYD TRACH DNA AMP PROBE: CPT

## 2019-06-10 PROCEDURE — 87210 SMEAR WET MOUNT SALINE/INK: CPT

## 2019-06-10 PROCEDURE — 87591 N.GONORRHOEAE DNA AMP PROB: CPT

## 2019-06-10 PROCEDURE — 87086 URINE CULTURE/COLONY COUNT: CPT

## 2019-06-10 RX ORDER — ACETAMINOPHEN 500 MG
1000 TABLET ORAL ONCE
Status: COMPLETED | OUTPATIENT
Start: 2019-06-10 | End: 2019-06-10

## 2019-06-10 RX ORDER — METRONIDAZOLE 500 MG/1
500 TABLET ORAL 2 TIMES DAILY
Qty: 14 TABLET | Refills: 0 | Status: SHIPPED | OUTPATIENT
Start: 2019-06-10 | End: 2019-06-17

## 2019-06-10 RX ADMIN — ACETAMINOPHEN 1000 MG: 500 TABLET ORAL at 15:45

## 2019-06-10 ASSESSMENT — PAIN SCALES - GENERAL
PAINLEVEL_OUTOF10: 7

## 2019-06-10 ASSESSMENT — PAIN DESCRIPTION - PROGRESSION
CLINICAL_PROGRESSION: NOT CHANGED
CLINICAL_PROGRESSION: GRADUALLY WORSENING

## 2019-06-10 ASSESSMENT — PAIN DESCRIPTION - LOCATION
LOCATION: BACK;ABDOMEN
LOCATION: PELVIS;PERINEUM

## 2019-06-10 ASSESSMENT — PAIN DESCRIPTION - ONSET
ONSET: ON-GOING
ONSET: ON-GOING

## 2019-06-10 ASSESSMENT — PAIN DESCRIPTION - PAIN TYPE
TYPE: ACUTE PAIN
TYPE: ACUTE PAIN

## 2019-06-10 ASSESSMENT — ENCOUNTER SYMPTOMS
ABDOMINAL PAIN: 1
VOMITING: 0
NAUSEA: 0

## 2019-06-10 ASSESSMENT — PAIN DESCRIPTION - DESCRIPTORS
DESCRIPTORS: CRAMPING;DISCOMFORT
DESCRIPTORS: PRESSURE

## 2019-06-10 ASSESSMENT — PAIN - FUNCTIONAL ASSESSMENT
PAIN_FUNCTIONAL_ASSESSMENT: ACTIVITIES ARE NOT PREVENTED
PAIN_FUNCTIONAL_ASSESSMENT: ACTIVITIES ARE NOT PREVENTED

## 2019-06-10 ASSESSMENT — PAIN DESCRIPTION - FREQUENCY
FREQUENCY: CONTINUOUS
FREQUENCY: CONTINUOUS

## 2019-06-10 NOTE — ED PROVIDER NOTES
SURGICAL HISTORY           Procedure Laterality Date    DILATION AND CURETTAGE OF UTERUS      MAB       CURRENT MEDICATIONS       Previous Medications    No medications on file       ALLERGIES     Patient has no known allergies. FAMILY HISTORY           Problem Relation Age of Onset    Diabetes Mother     Diabetes Maternal Grandmother     High Blood Pressure Maternal Grandmother     Diabetes Maternal Uncle      Family Status   Relation Name Status    Mother  Alive    [facility]  (Not Specified)    [facility]  (Not Specified)    Father  Alive    Sister  Alive        SOCIAL HISTORY      reports that she has never smoked. She has never used smokeless tobacco. She reports that she does not drink alcohol or use drugs. PHYSICAL EXAM    (up to 7 for level 4, 8 or more for level 5)     ED Triage Vitals [06/10/19 1240]   BP Temp Temp Source Pulse Resp SpO2 Height Weight   114/73 98.5 °F (36.9 °C) Oral 92 18 100 % 5' 3\" (1.6 m) 149 lb 7.6 oz (67.8 kg)       Physical Exam   Constitutional: She is oriented to person, place, and time. She appears well-developed and well-nourished. No distress. HENT:   Head: Normocephalic and atraumatic. Nose: Nose normal.   Eyes: EOM are normal.   Neck: Normal range of motion. Neck supple. Cardiovascular: Normal rate, regular rhythm and normal heart sounds. Pulmonary/Chest: Effort normal and breath sounds normal. No respiratory distress. Abdominal: Soft. She exhibits no distension and no mass. There is tenderness (mild TTP entire lower abdomen, nonfocal). There is no rebound and no guarding. No hernia.   +gravid abd, fundus palpable just below umbilicus   Genitourinary:   Genitourinary Comments: Sterile speculum exam performed with sterile speculum. Normal female external genitalia. Mild amount of thin, white discharge in vaginal vault. Cervix normal. Os closed. Scant blood from os. Bimanual performed with sterile gloves. Os feels closed.   No CMT or bilateral adnexal tenderness    [place] ED RN chaperoned my pelvic   Musculoskeletal: Normal range of motion. Neurological: She is alert and oriented to person, place, and time. Skin: Skin is warm and dry. She is not diaphoretic. Psychiatric: She has a normal mood and affect.  Her behavior is normal. Judgment and thought content normal.       DIFFERENTIAL DIAGNOSIS   Appendicitis, Small Bowel Obstruction, Pancreatitis, Cholesystitis, Hepatitis, AorticDissection, DKA, Pylonephritis, Kidney Stone, Diverticulitis, Ectopic Pregnancy, PID, Ovarian Torsion, Ovarian cyst, TOA, UTI, STD    DIAGNOSTIC RESULTS       RADIOLOGY:   Non-plain film images such as CT, Ultrasound and MRI are read by the radiologist. Melany Hudson radiographic images are visualized and preliminarily interpreted by NEIL Gloria with the below findings:      Interpretation per the Radiologist below, if available at the time of this note:    No orders to display         LABS:  Results for orders placed or performed during the hospital encounter of 06/10/19   Wet prep, genital   Result Value Ref Range    Trichomonas Prep None Seen     Yeast, Wet Prep None Seen     Clue Cells, Wet Prep <1+ (A)     WBC, Wet Prep 3+     RBC, Wet Prep None Seen     Epi Cells 3+     Bacteria 4+     Source Wet Prep Vaginal    Urinalysis Reflex to Culture   Result Value Ref Range    Color, UA YELLOW Straw/Yellow    Clarity, UA CLOUDY (A) Clear    Glucose, Ur Negative Negative mg/dL    Bilirubin Urine Negative Negative    Ketones, Urine Negative Negative mg/dL    Specific Gravity, UA 1.008 1.005 - 1.030    Blood, Urine SMALL (A) Negative    pH, UA 6.5 5.0 - 8.0    Protein, UA Negative Negative mg/dL    Urobilinogen, Urine 0.2 <2.0 E.U./dL    Nitrite, Urine Negative Negative    Leukocyte Esterase, Urine SMALL (A) Negative    Microscopic Examination YES     Urine Reflex to Culture Yes     Urine Type Not Specified    Pregnancy, Urine   Result Value Ref Range    HCG(Urine) Pregnancy Test POSITIVE Detects HCG level >20 MIU/mL   Microscopic Urinalysis   Result Value Ref Range    Bacteria, UA RARE (A) /HPF    Hyaline Casts, UA 0 0 - 8 /LPF    WBC, UA 2 0 - 5 /HPF    RBC, UA 0 0 - 4 /HPF    Epi Cells 8 (H) 0 - 5 /HPF       All other labs were within normal range or not returned as of this dictation. EMERGENCY DEPARTMENT COURSE and DIFFERENTIAL DIAGNOSIS/MDM:   Vitals:    Vitals:    06/10/19 1240   BP: 114/73   Pulse: 92   Resp: 18   Temp: 98.5 °F (36.9 °C)   TempSrc: Oral   SpO2: 100%   Weight: 149 lb 7.6 oz (67.8 kg)   Height: 5' 3\" (1.6 m)       Patient was seen and examined by myself and Dr. Levy Cotton. Patient was nontoxic, well appearing, afebrile with normal vital signs. Patient was saturating well on room air. Os is closed on exam.  . Urine negative. Wet prep with less than 1+ clue cells. Dr. Levy Cotton did see fetal movement on US. I consulted OB/GYN upstairs and spoke with Dr. Roselia Woods who recommended treating with Flagyl PO for BV. Does not need evaluation upstairs or formal US at this point. Recommended pelvic rest until seen by OB. Discussed pelvic rest with patient and to fU with OB in 1-2 days for reeval. Return for worsening. She agreed and understood,. This patient was seen by the attending physician and they agreed with the assessment and plan. I estimate there is LOW risk for ACUTE APPENDICITIS, BOWEL OBSTRUCTION, CHOLECYSTITIS, DIVERTICULITIS, INCARCERATED HERNIA, PANCREATITIS, PELVIC INFLAMMATORYDISEASE, PERFORATED BOWEL or ULCER, ECTOPIC PREGNANCY, or TUBO-OVARIAN ABSCESS, thus I consider the discharge disposition reasonable. Also, there is no evidence or peritonitis, sepsis, or toxicity. CONSULTS:  None    PROCEDURES:  None    FINAL IMPRESSION      1. Abdominal pain during pregnancy in second trimester    2.  Bacterial vaginosis          DISPOSITION/PLAN   DISPOSITION        PATIENT REFERRED TO:  Your OB/GYN    Schedule an appointment as soon as possible for a visit in 1 day  for reevaluation    [facility]  [address] [place] [address]  [phone]    As needed, If symptoms worsen, for worsening pain, or if bleeding  or if fever or for any other concerns      DISCHARGE MEDICATIONS:  New Prescriptions    METRONIDAZOLE (FLAGYL) 500 MG TABLET    Take 1 tablet by mouth 2 times daily for 7 days       (Please note that portions of this note were completed with a voicerecognition program.  Efforts were made to edit the dictations but occasionally words are mis-transcribed.)    Noa Argueta, [address] [place] [address]  06/10/19 3625

## 2019-06-10 NOTE — ED PROVIDER NOTES
[facility]  eMERGENCY dEPARTMENT eNCOUnter   Physician Attestation    Pt Name: Winifred Gutierrez  MRN: [id number]  [place] 1990  Date of evaluation: 6/10/19        Physician Note:    I havepersonally performed and/or participated in the history, exam and medical decision making and agree with all pertinent clinical information. I have also reviewed and agree with the past medical, family and social historyunless otherwise noted. I have personally performed a face to face diagnostic evaluation onthis patient. I have reviewed the mid-levels findings and agree. History: This is a 69-year-old  5 para 3 female who is 19 weeks and 5 days pregnant. Presents with some abdominal cramping and increased urinary frequency. The pain actually gets worse after she urinates. No vaginal bleeding    Physical Exam   Constitutional: She is oriented to person, place, and time. She appears well-developed and well-nourished. HENT:   Head: Normocephalic and atraumatic. Right Ear: External ear normal.   Left Ear: External ear normal.   Eyes: Conjunctivae are normal. Right eye exhibits no discharge. Left eye exhibits no discharge. No scleral icterus. Neck: Normal range of motion. No tracheal deviation present. Pulmonary/Chest: Effort normal. No stridor. No respiratory distress. Abdominal:   Gravid uterus at about 20 weeks. The uterus itself is nontender. Bedside ultrasound shows excellent fetal movement and fetal heart tones of 144. Musculoskeletal: Normal range of motion. Neurological: She is alert and oriented to person, place, and time. Coordination normal.   Skin: Skin is warm and dry. She is not diaphoretic. Psychiatric: She has a normal mood and affect. Her behavior is normal.   Nursing note and vitals reviewed. 1. Abdominal pain during pregnancy in second trimester    2.  Bacterial vaginosis          DISPOSITION/PLAN  PATIENT REFERRED TO:  Your OB/GYN    Schedule an

## 2019-06-10 NOTE — ED NOTES
RN to room with PA for vaginal exam. Pt tolerated well. Specimens sent to lab.       Neeraj Delgadillo RN  06/10/19 5415

## 2019-06-10 NOTE — ED NOTES
Discharge and education instructions reviewed. Patient instructed to follow-up as noted - return to emergency department if symptoms worsen. Patient verbalized understanding. Patient denied questions at this time. No acute distress noted. Discharged per provider with discharge instructions.         Waqar Bruner RN  06/10/19 5091

## 2019-06-10 NOTE — ED NOTES
Pt up and to BR. Pt presents with steady gait and tolerates well.       Robby Escoto RN  06/10/19 9940

## 2019-06-11 ENCOUNTER — HOSPITAL ENCOUNTER (OUTPATIENT)
Dept: OBGYN CLINIC | Age: 29
Discharge: HOME | End: 2019-06-11
Payer: COMMERCIAL

## 2019-06-11 ENCOUNTER — HOSPITAL ENCOUNTER (OUTPATIENT)
Dept: ULTRASOUND IMAGING | Age: 29
Discharge: HOME | End: 2019-06-11
Payer: COMMERCIAL

## 2019-06-11 VITALS
HEART RATE: 98 BPM | SYSTOLIC BLOOD PRESSURE: 112 MMHG | WEIGHT: 150.25 LBS | DIASTOLIC BLOOD PRESSURE: 65 MMHG | BODY MASS INDEX: 26.62 KG/M2

## 2019-06-11 DIAGNOSIS — Z87.59 HISTORY OF PRE-ECLAMPSIA: ICD-10-CM

## 2019-06-11 DIAGNOSIS — Z3A.19 19 WEEKS GESTATION OF PREGNANCY: ICD-10-CM

## 2019-06-11 DIAGNOSIS — R82.71 GBS BACTERIURIA: ICD-10-CM

## 2019-06-11 DIAGNOSIS — O99.820 GBS (GROUP B STREPTOCOCCUS CARRIER), +RV CULTURE, CURRENTLY PREGNANT: ICD-10-CM

## 2019-06-11 DIAGNOSIS — Z3A.17 17 WEEKS GESTATION OF PREGNANCY: ICD-10-CM

## 2019-06-11 LAB
BILIRUBIN URINE: NEGATIVE MG/DL
BLOOD, URINE: ABNORMAL
C TRACH DNA GENITAL QL NAA+PROBE: NEGATIVE
CLARITY: CLEAR
COLOR: YELLOW
GLUCOSE URINE: NEGATIVE MG/DL
KETONES, URINE: NEGATIVE MG/DL
LEUKOCYTE ESTERASE, URINE: NEGATIVE
N. GONORRHOEAE DNA: NEGATIVE
NITRITE, URINE: NEGATIVE
ORGANISM: ABNORMAL
PH UA: 6 (ref 5–8)
PROTEIN UA: 30 MG/DL
SPECIFIC GRAVITY UA: 1.02 (ref 1–1.03)
URINE CULTURE, ROUTINE: ABNORMAL
URINE CULTURE, ROUTINE: ABNORMAL
UROBILINOGEN, URINE: 0.2 E.U./DL

## 2019-06-11 PROCEDURE — 99212 OFFICE O/P EST SF 10 MIN: CPT | Performed by: OBSTETRICS & GYNECOLOGY

## 2019-06-11 PROCEDURE — 99211 OFF/OP EST MAY X REQ PHY/QHP: CPT

## 2019-06-11 PROCEDURE — 76805 OB US >/= 14 WKS SNGL FETUS: CPT

## 2019-06-11 PROCEDURE — 81003 URINALYSIS AUTO W/O SCOPE: CPT

## 2019-06-11 NOTE — RESULT ENCOUNTER NOTE
Culture reviewed, please contact patient and inform them of the results and cephalexin 500 mg po BID x 7 days.

## 2019-06-11 NOTE — PLAN OF CARE
Prenatal Clinic  Clinical Level of [address].    NAME: Sudarshan Money OF BIRTH:  1990 GENDER: female  MEDICAL RECORD NUMBER:  [id number]  DATE:  6/11/2019    Assessment   Points   No Assessment []   0   General Prenatal assessment (e.g. weight, vital signs, urine test). Completed OB Clinic navigator tabs, in partnership with the Registered Nurse. [x]   1   General Prenatal assessment (e.g. weight, vital signs, urine test). Completed OB Clinic navigator tabs, in partnership with the Registered Nurse. Set up tests or procedures (e.g. ultrasound, specimen swabs, induction). []   2   Full Prenatal assessment (e.g. weight, vitals signs, urine test) to include a full review of history. Completed OB Clinic navigator tabs, in partnership with the Registered Nurse. Set up tests or procedures (e.g. ultrasound, specimen swabs, induction). Or transfer to an outside facility, transfer to [facility] for further evaluation, admission to the hospital.  []   3       Ambulation Status   Status Definition Points   Independent Independently able to ambulate. Fully able (without any assistance) to get on/off exam table/chair. [x]   0   Minimal Physical Assistance Requires physical assistance of one person to ambulate and/or position patient to be examined. Includes necessary physical assistance to position lower extremities on/off stool. []   1   Moderate Physical Assistance Requires at least one staff member to physically assist patient in ambulating into treatment room, and on/off recliner chair. []   2   Full Assistance Requires assistance of at least two staff members to transfer patient into treatment room and/or on/off exam table/chair. \"Total Transfer\". []   3       Teaching Effort   Effort Definition Points   No Teaching  []   0   General The teaching will focus on visit schedule and laboratory tests. This education can be found in the Discharge Instructions.  [x]   1 Intermediate The teaching will focus on visit schedule and laboratory tests, plus additional topics such as health and lifestyle (e.g. kick counts, diet). This education can be found in the Discharge Instructions []   2   Complex New patient information packet given to the patient/caregiver and reviewed with the Registered Nurse.   []   3       Patient Discharge and Planning  Planning Definition Points   General Follow-up with routine assessment and planning. Discharge instructions and After Visit Summary given to patient/caregiver and reviewed with the Registered Nurse. Simple follow-up with routine assessment and planning. [x]   1   Intermediate Follow-up with routine assessment and planning. Discharge instructions and After Visit Summary given to patient/caregiver and reviewed with the Registered Nurse. Contact with additional resources (e.g. , Physician, Lactation). May include filling out forms, writing letters, communication with insurance , FLMA forms, etc.   []   2   Complex Full, comprehensive assessment and planning which includes assistance for a hospital admission or transfer to a higher level of care facility.   []   3     Is this the Patient's First Visit to the Prenatal Clinic    No     Is this Patient Established @ this [facility] AT [place]  Yes             Clinical Level of Care      Points  0-3  Level 1 [x]     Points  4-6  Level 2 []     Points  7-8  Level 3 []     Points  9-10  Level 4 []     Points  11-12  Level 5 []       Electronically signed by Jeanne Hinkle RN on 6/11/2019 at 3:04 PM

## 2019-06-11 NOTE — PROGRESS NOTES
Prenatal [address] [place] [address]  [address] [facility] [address]      OB Follow-Up Visit Progress Note    Chief Complaint   Patient presents with    Routine Prenatal Visit     Complaint of suprapubic pain. Makes it sore to walk. Seen in the ED yesterday for this complaint. Subjective:     HISTORY OF PRESENT ILLNESS (HPI)    History of  Nicol Perez is a 33 y.o., [id number] at 19w6d who presents to the clinic for her follow-up OB visit. Lindy Montenegro  denies any complaints at this time. She does not complain of vaginal bleeding. She does not report leakage of fluid. She  does not complain of contractions. She  does not complain of decreased fetal movement.     PAST MEDICAL HISTORY        Diagnosis Date    Anemia     with last pregnancy    Miscarriage     Miscarriage     D and C     Preeclampsia, third trimester 2018        PAST SURGICAL HISTORY    Past Surgical History:   Procedure Laterality Date    DILATION AND CURETTAGE OF UTERUS      MAB       FAMILY HISTORY    Family History   Problem Relation Age of Onset    Diabetes Mother     Diabetes Maternal Grandmother     High Blood Pressure Maternal Grandmother     Diabetes Maternal Uncle        SOCIAL HISTORY    Social History     Tobacco Use    Smoking status: Never Smoker    Smokeless tobacco: Never Used   Substance Use Topics    Alcohol use: No    Drug use: No       OB HISTORY    OB History    Para Term  AB Living   6 3 2 1 1 3   SAB TAB Ectopic Molar Multiple Live Births   1 0 0 0 0 3      # Outcome Date GA Lbr Rafael/2nd Weight Sex Delivery Anes PTL Lv   6 Current            5 Term 18 38w1d  6 lb 11 oz (3.033 kg) F  EPI N JUSTINE      Name: Porter Lynn   4  17 36w6d  5 lb 9.2 oz (2.53 kg) F Vag-Spont EPI Y JUSTINE      Name: Kerrineyda Bethea: 7  Apgar5: 9   3 Term 14 38w6d  7 lb 7 oz (3.374 kg)  Vag-Spont   JUSTINE      Apgar1: 7 Apgar5: 9   2 2013           1                 ALLERGIES    No Known Allergies    MEDICATIONS    Prior to Admission medications    Medication Sig Start Date End Date Taking? Authorizing Provider   metroNIDAZOLE (FLAGYL) 500 MG tablet Take 1 tablet by mouth 2 times daily for 7 days 6/10/19 6/17/19 Yes NEIL Esposito       REVIEW OF SYSTEMS    Pertinent items are noted in HPI. Objective:      /65   Pulse 98   Wt 150 lb 4 oz (68.2 kg)   LMP 2019   BMI 26.62 kg/m²     Wt Readings from Last 2 Encounters:   19 150 lb 4 oz (68.2 kg)   06/10/19 149 lb 7.6 oz (67.8 kg)       See ACOG flowsheet above    Physical Exam:  A physical exam was performed during this visit. Neurologic/Psychiatric: alert and oriented X3  Constitutional: alert and oriented to person, place and time, well-developed and well-nourished, in no acute distress  Cardiovascular: Edema  is not present. Respiratory effort: Respirations  are easy and without stridor. Gastrointestinal: Abdomen  is soft and  is non tender and gravid  Vagina: Not examined at this visit. Bladder: Not examined at this visit. Uterus:  Non tender. Pelvic Exam:  A pelvic exam was not completed at this visit. Assessment:     Patient Active Problem List   Diagnosis Code    Anemia affecting pregnancy in second trimester O99.012    33 y.o. [id number] Z34.90    History of pre-eclampsia Z87.59    GBS (group B Streptococcus carrier), +RV culture, currently pregnant O99.820       33 y.o. [id number] 19w6d    Obstetrician: Yong Marcum MD      Plan:     - Prenatal labs: Reviewed by the physician  - Patient to follow up in: 4 weeks, US today, constipation disc Fluids metamucil, GBS in urine, Rx in labor disc    Kick count reinforced. Pregnancy expectations were discussed and all questions were answered.                 Electronically signed by Yong Marcum MD on 2019 at 2:27 PM

## 2019-07-09 ENCOUNTER — HOSPITAL ENCOUNTER (OUTPATIENT)
Dept: OBGYN CLINIC | Age: 29
Discharge: HOME | End: 2019-07-09
Payer: COMMERCIAL

## 2019-07-09 VITALS
BODY MASS INDEX: 27.86 KG/M2 | HEART RATE: 98 BPM | DIASTOLIC BLOOD PRESSURE: 63 MMHG | SYSTOLIC BLOOD PRESSURE: 101 MMHG | WEIGHT: 157.25 LBS

## 2019-07-09 DIAGNOSIS — O99.820 GBS (GROUP B STREPTOCOCCUS CARRIER), +RV CULTURE, CURRENTLY PREGNANT: ICD-10-CM

## 2019-07-09 DIAGNOSIS — Z87.59 HISTORY OF PRE-ECLAMPSIA: ICD-10-CM

## 2019-07-09 DIAGNOSIS — Z3A.23 23 WEEKS GESTATION OF PREGNANCY: ICD-10-CM

## 2019-07-09 DIAGNOSIS — B96.89 BACTERIAL VAGINOSIS: ICD-10-CM

## 2019-07-09 DIAGNOSIS — N76.0 BACTERIAL VAGINOSIS: ICD-10-CM

## 2019-07-09 LAB
BILIRUBIN URINE: NEGATIVE MG/DL
BLOOD, URINE: ABNORMAL
CLARITY: CLEAR
COLOR: YELLOW
GLUCOSE URINE: NEGATIVE MG/DL
KETONES, URINE: NEGATIVE MG/DL
LEUKOCYTE ESTERASE, URINE: ABNORMAL
NITRITE, URINE: NEGATIVE
PH UA: 6 (ref 5–8)
PROTEIN UA: NEGATIVE MG/DL
SPECIFIC GRAVITY UA: 1.02 (ref 1–1.03)
UROBILINOGEN, URINE: 0.2 E.U./DL

## 2019-07-09 PROCEDURE — 81003 URINALYSIS AUTO W/O SCOPE: CPT

## 2019-07-09 PROCEDURE — 99212 OFFICE O/P EST SF 10 MIN: CPT

## 2019-07-09 PROCEDURE — 87480 CANDIDA DNA DIR PROBE: CPT

## 2019-07-09 PROCEDURE — 87660 TRICHOMONAS VAGIN DIR PROBE: CPT

## 2019-07-09 PROCEDURE — 99213 OFFICE O/P EST LOW 20 MIN: CPT | Performed by: OBSTETRICS & GYNECOLOGY

## 2019-07-09 PROCEDURE — 87086 URINE CULTURE/COLONY COUNT: CPT

## 2019-07-09 PROCEDURE — 87510 GARDNER VAG DNA DIR PROBE: CPT

## 2019-07-09 NOTE — CARE COORDINATION
Therapist met with pt. She stated that she was ready for baby boy to arrive. Pt seemed to be in good spirits, stating that she has been focused on her and the baby which has brought her happiness. She is less stressed at this point. Pt seems to be more positive and has a better outlook on life. Therapist went over additional ways to COPE and discussed some self esteem building exercises. Therapist gave pt homework, she is to return it with her name on front of envelope.      DELFIN Carvalho

## 2019-07-09 NOTE — PLAN OF CARE
Prenatal Clinic  Clinical Level of [address].    NAME: Kyle Safe OF BIRTH:  1990 GENDER: female  MEDICAL RECORD NUMBER:  [id number]  DATE:  7/9/2019    Assessment   Points   No Assessment []   0   General Prenatal assessment (e.g. weight, vital signs, urine test). Completed OB Clinic navigator tabs, in partnership with the Registered Nurse. []   1   General Prenatal assessment (e.g. weight, vital signs, urine test). Completed OB Clinic navigator tabs, in partnership with the Registered Nurse. Set up tests or procedures (e.g. ultrasound, specimen swabs, induction). []   2   Full Prenatal assessment (e.g. weight, vitals signs, urine test) to include a full review of history. Completed OB Clinic navigator tabs, in partnership with the Registered Nurse. Set up tests or procedures (e.g. ultrasound, specimen swabs, induction). Or transfer to an outside facility, transfer to [facility] for further evaluation, admission to the hospital.  [x]   3       Ambulation Status   Status Definition Points   Independent Independently able to ambulate. Fully able (without any assistance) to get on/off exam table/chair. [x]   0   Minimal Physical Assistance Requires physical assistance of one person to ambulate and/or position patient to be examined. Includes necessary physical assistance to position lower extremities on/off stool. []   1   Moderate Physical Assistance Requires at least one staff member to physically assist patient in ambulating into treatment room, and on/off recliner chair. []   2   Full Assistance Requires assistance of at least two staff members to transfer patient into treatment room and/or on/off exam table/chair. \"Total Transfer\". []   3       Teaching Effort   Effort Definition Points   No Teaching  []   0   General The teaching will focus on visit schedule and laboratory tests. This education can be found in the Discharge Instructions.  [x]   1 Intermediate The teaching will focus on visit schedule and laboratory tests, plus additional topics such as health and lifestyle (e.g. kick counts, diet). This education can be found in the Discharge Instructions []   2   Complex New patient information packet given to the patient/caregiver and reviewed with the Registered Nurse.   []   3       Patient Discharge and Planning  Planning Definition Points   General Follow-up with routine assessment and planning. Discharge instructions and After Visit Summary given to patient/caregiver and reviewed with the Registered Nurse. Simple follow-up with routine assessment and planning. [x]   1   Intermediate Follow-up with routine assessment and planning. Discharge instructions and After Visit Summary given to patient/caregiver and reviewed with the Registered Nurse. Contact with additional resources (e.g. , Physician, Lactation). May include filling out forms, writing letters, communication with insurance , FLMA forms, etc.   []   2   Complex Full, comprehensive assessment and planning which includes assistance for a hospital admission or transfer to a higher level of care facility.   []   3     Is this the Patient's First Visit to the Prenatal Clinic    No     Is this Patient Established @ this [facility] [place]  Yes             Clinical Level of Care      Points  0-3  Level 1 []     Points  4-6  Level 2 [x]     Points  7-8  Level 3 []     Points  9-10  Level 4 []     Points  11-12  Level 5 []       Electronically signed by Antonio Dumont RN on 7/9/2019 at 1:46 PM

## 2019-07-09 NOTE — PROGRESS NOTES
Prenatal [facility]  [address], [address] [place], [facility] [address]      OB Follow-Up Visit Progress Note    Chief Complaint   Patient presents with    Routine Prenatal Visit        Subjective:     HISTORY OF PRESENT ILLNESS (HPI)    History of  Abisai Link is a 33 y.o., [id number] at 23w6d who presents to the clinic for her follow-up OB visit. Lindy Lane Gain  denies any complaints at this time other than continued vaginal discharge. Pt states she has completed T3, Flagyl and another antibiotic without relief. Pt denies douching, baths. She does not complain of vaginal bleeding. She does not report leakage of fluid. She  does not complain of contractions. She  does not complain of decreased fetal movement. PAST MEDICAL HISTORY        Diagnosis Date    Anemia     with last pregnancy    Miscarriage     Miscarriage     D and C     Preeclampsia, third trimester 7/31/2018        PAST SURGICAL HISTORY    Past Surgical History:   Procedure Laterality Date    DILATION AND CURETTAGE OF UTERUS      MAB       FAMILY HISTORY    Family History   Problem Relation Age of Onset    Diabetes Mother     Diabetes Maternal Grandmother     High Blood Pressure Maternal Grandmother     Diabetes Maternal Uncle        SOCIAL HISTORY    Social History     Tobacco Use    Smoking status: Never Smoker    Smokeless tobacco: Never Used   Substance Use Topics    Alcohol use: No    Drug use: No       ALLERGIES    No Known Allergies    MEDICATIONS    Current Outpatient Medications on File Prior to Encounter   Medication Sig Dispense Refill    Prenatal Vit-Fe Fumarate-FA (PRENATAL VITAMIN PO) Take 1 tablet by mouth daily       No current facility-administered medications on file prior to encounter. REVIEW OF SYSTEMS    Pertinent items are noted in HPI.     Objective:      /63   Pulse 98   Wt 157 lb 4 oz (71.3 kg)   LMP 01/23/2019   BMI 27.86 kg/m²     Wt Readings

## 2019-07-09 NOTE — CARE COORDINATION
LSW met with patient for follow up care. Patient states that she is doing well and denies any needs. Patient was also seen by AD Bland. LSW asked about infant care items. Patient states that she still does have some concerns about providing for infant. LSW provided patient tool kit for assistance obtaining infant care items. At this time no other needs have been indicated.    Electronically signed by Jimmy Ramos on 7/9/2019 at 1:59 PM

## 2019-07-11 LAB
ORGANISM: ABNORMAL
URINE CULTURE, ROUTINE: ABNORMAL
URINE CULTURE, ROUTINE: ABNORMAL

## 2019-08-06 ENCOUNTER — HOSPITAL ENCOUNTER (OUTPATIENT)
Dept: OBGYN CLINIC | Age: 29
Discharge: HOME | End: 2019-08-06
Payer: COMMERCIAL

## 2019-08-06 ENCOUNTER — APPOINTMENT (OUTPATIENT)
Dept: OBGYN CLINIC | Age: 29
End: 2019-08-06
Payer: COMMERCIAL

## 2019-08-06 VITALS
SYSTOLIC BLOOD PRESSURE: 103 MMHG | WEIGHT: 157 LBS | DIASTOLIC BLOOD PRESSURE: 69 MMHG | BODY MASS INDEX: 27.81 KG/M2 | HEART RATE: 91 BPM

## 2019-08-06 DIAGNOSIS — O99.820 GBS (GROUP B STREPTOCOCCUS CARRIER), +RV CULTURE, CURRENTLY PREGNANT: ICD-10-CM

## 2019-08-06 DIAGNOSIS — Z87.59 HISTORY OF PRE-ECLAMPSIA: ICD-10-CM

## 2019-08-06 DIAGNOSIS — Z3A.27 27 WEEKS GESTATION OF PREGNANCY: ICD-10-CM

## 2019-08-06 LAB
ABO/RH: NORMAL
ANTIBODY SCREEN: NORMAL
BASOPHILS ABSOLUTE: 0.1 K/UL (ref 0–0.2)
BASOPHILS RELATIVE PERCENT: 1.3 %
BILIRUBIN URINE: NEGATIVE MG/DL
BLOOD, URINE: ABNORMAL
CLARITY: CLEAR
COLOR: YELLOW
EOSINOPHILS ABSOLUTE: 0.1 K/UL (ref 0–0.6)
EOSINOPHILS RELATIVE PERCENT: 2.5 %
GLUCOSE CHALLENGE: 101 MG/DL
GLUCOSE URINE: NEGATIVE MG/DL
HCT VFR BLD CALC: 28.5 % (ref 36–48)
HEMATOLOGY PATH CONSULT: NO
HEMOGLOBIN: 8.8 G/DL (ref 12–16)
KETONES, URINE: NEGATIVE MG/DL
LEUKOCYTE ESTERASE, URINE: ABNORMAL
LYMPHOCYTES ABSOLUTE: 1.3 K/UL (ref 1–5.1)
LYMPHOCYTES RELATIVE PERCENT: 24.3 %
MCH RBC QN AUTO: 19.5 PG (ref 26–34)
MCHC RBC AUTO-ENTMCNC: 30.9 G/DL (ref 31–36)
MCV RBC AUTO: 63.2 FL (ref 80–100)
MONOCYTES ABSOLUTE: 0.4 K/UL (ref 0–1.3)
MONOCYTES RELATIVE PERCENT: 8.2 %
NEUTROPHILS ABSOLUTE: 3.4 K/UL (ref 1.7–7.7)
NEUTROPHILS RELATIVE PERCENT: 63.7 %
NITRITE, URINE: NEGATIVE
PDW BLD-RTO: 17.1 % (ref 12.4–15.4)
PH UA: 7 (ref 5–8)
PLATELET # BLD: 333 K/UL (ref 135–450)
PMV BLD AUTO: 8 FL (ref 5–10.5)
PROTEIN UA: ABNORMAL MG/DL
RBC # BLD: 4.51 M/UL (ref 4–5.2)
SPECIFIC GRAVITY UA: 1.02 (ref 1–1.03)
UROBILINOGEN, URINE: 0.2 E.U./DL
WBC # BLD: 5.3 K/UL (ref 4–11)

## 2019-08-06 PROCEDURE — 86900 BLOOD TYPING SEROLOGIC ABO: CPT

## 2019-08-06 PROCEDURE — 86901 BLOOD TYPING SEROLOGIC RH(D): CPT

## 2019-08-06 PROCEDURE — 86780 TREPONEMA PALLIDUM: CPT

## 2019-08-06 PROCEDURE — 85025 COMPLETE CBC W/AUTO DIFF WBC: CPT

## 2019-08-06 PROCEDURE — 99212 OFFICE O/P EST SF 10 MIN: CPT

## 2019-08-06 PROCEDURE — 99211 OFF/OP EST MAY X REQ PHY/QHP: CPT

## 2019-08-06 PROCEDURE — 99213 OFFICE O/P EST LOW 20 MIN: CPT | Performed by: OBSTETRICS & GYNECOLOGY

## 2019-08-06 PROCEDURE — 86850 RBC ANTIBODY SCREEN: CPT

## 2019-08-06 PROCEDURE — 82950 GLUCOSE TEST: CPT

## 2019-08-06 PROCEDURE — 81003 URINALYSIS AUTO W/O SCOPE: CPT

## 2019-08-07 LAB — TOTAL SYPHILLIS IGG/IGM: NORMAL

## 2019-08-08 ENCOUNTER — TELEPHONE (OUTPATIENT)
Dept: OBGYN CLINIC | Age: 29
End: 2019-08-08

## 2019-08-08 RX ORDER — FERROUS SULFATE 325(65) MG
325 TABLET ORAL 2 TIMES DAILY WITH MEALS
COMMUNITY
End: 2021-08-25 | Stop reason: ALTCHOICE

## 2019-08-16 ENCOUNTER — HOSPITAL ENCOUNTER (OUTPATIENT)
Age: 29
Discharge: HOME | End: 2019-08-16
Attending: OBSTETRICS & GYNECOLOGY | Admitting: OBSTETRICS & GYNECOLOGY
Payer: COMMERCIAL

## 2019-08-16 VITALS
BODY MASS INDEX: 27.82 KG/M2 | DIASTOLIC BLOOD PRESSURE: 83 MMHG | RESPIRATION RATE: 18 BRPM | TEMPERATURE: 98.8 F | HEART RATE: 79 BPM | HEIGHT: 63 IN | WEIGHT: 157 LBS | SYSTOLIC BLOOD PRESSURE: 124 MMHG

## 2019-08-16 PROBLEM — O47.00 PRETERM CONTRACTIONS: Status: ACTIVE | Noted: 2019-08-16

## 2019-08-16 LAB
BILIRUBIN URINE: NEGATIVE
BLOOD, URINE: ABNORMAL
CLARITY: CLEAR
COLOR: YELLOW
EPITHELIAL CELLS, UA: 1 /HPF (ref 0–5)
FETAL FIBRONECTIN: POSITIVE
GLUCOSE URINE: NEGATIVE MG/DL
HYALINE CASTS: 1 /LPF (ref 0–8)
KETONES, URINE: NEGATIVE MG/DL
LEUKOCYTE ESTERASE, URINE: ABNORMAL
MICROSCOPIC EXAMINATION: YES
NITRITE, URINE: NEGATIVE
PH UA: 6 (ref 5–8)
PROTEIN UA: NEGATIVE MG/DL
RBC UA: 1 /HPF (ref 0–4)
SPECIFIC GRAVITY UA: 1.01 (ref 1–1.03)
URINE REFLEX TO CULTURE: YES
URINE TYPE: ABNORMAL
UROBILINOGEN, URINE: 0.2 E.U./DL
WBC UA: 1 /HPF (ref 0–5)

## 2019-08-16 PROCEDURE — 81001 URINALYSIS AUTO W/SCOPE: CPT

## 2019-08-16 PROCEDURE — 99212 OFFICE O/P EST SF 10 MIN: CPT

## 2019-08-16 PROCEDURE — 82731 ASSAY OF FETAL FIBRONECTIN: CPT

## 2019-08-16 PROCEDURE — 6360000002 HC RX W HCPCS: Performed by: OBSTETRICS & GYNECOLOGY

## 2019-08-16 PROCEDURE — 99219 PR INITIAL OBSERVATION CARE/DAY 50 MINUTES: CPT | Performed by: OBSTETRICS & GYNECOLOGY

## 2019-08-16 PROCEDURE — 87086 URINE CULTURE/COLONY COUNT: CPT

## 2019-08-16 PROCEDURE — 96372 THER/PROPH/DIAG INJ SC/IM: CPT

## 2019-08-16 RX ORDER — TERBUTALINE SULFATE 1 MG/ML
0.25 INJECTION, SOLUTION SUBCUTANEOUS ONCE
Status: COMPLETED | OUTPATIENT
Start: 2019-08-16 | End: 2019-08-16

## 2019-08-16 RX ADMIN — TERBUTALINE SULFATE 0.25 MG: 1 INJECTION, SOLUTION SUBCUTANEOUS at 06:38

## 2019-08-16 ASSESSMENT — PAIN DESCRIPTION - DESCRIPTORS
DESCRIPTORS: ACHING;CRAMPING
DESCRIPTORS: ACHING;CRAMPING

## 2019-08-16 NOTE — FLOWSHEET NOTE
Patient to triage D for admission with c/o of  contractions. Patient oriented to room. Call light explained and provided. EFM applied with consent to central monitor bank with alarms on. Uterus soft and non-tender. Admission process started and completed as charted. Questions and concerns addressed/answered.

## 2019-08-16 NOTE — FLOWSHEET NOTE
in room to evaluate pt. Sterile spec performed and swabs obtained and sent to lab as ordered. POC discussed with pt and questions asked and answered. Pt given pitcher of ice waster for po hydration.

## 2019-08-16 NOTE — FLOWSHEET NOTE
Patient reporting that her contractions \"feel worse since she's been drinking water. \"  Notified Dr. Kristin Mills, requested order for brethine. Order for brethine and ua received. Urine collected and sent.

## 2019-08-17 LAB — URINE CULTURE, ROUTINE: NORMAL

## 2019-08-23 ENCOUNTER — TELEPHONE (OUTPATIENT)
Dept: OBGYN CLINIC | Age: 29
End: 2019-08-23

## 2019-08-27 ENCOUNTER — HOSPITAL ENCOUNTER (OUTPATIENT)
Dept: OBGYN CLINIC | Age: 29
Discharge: HOME | End: 2019-08-27
Payer: COMMERCIAL

## 2019-08-27 VITALS
WEIGHT: 165.13 LBS | SYSTOLIC BLOOD PRESSURE: 121 MMHG | HEART RATE: 79 BPM | BODY MASS INDEX: 29.25 KG/M2 | DIASTOLIC BLOOD PRESSURE: 83 MMHG

## 2019-08-27 DIAGNOSIS — Z3A.30 30 WEEKS GESTATION OF PREGNANCY: ICD-10-CM

## 2019-08-27 DIAGNOSIS — O99.820 GBS (GROUP B STREPTOCOCCUS CARRIER), +RV CULTURE, CURRENTLY PREGNANT: ICD-10-CM

## 2019-08-27 DIAGNOSIS — Z87.59 HISTORY OF PRE-ECLAMPSIA: ICD-10-CM

## 2019-08-27 LAB
BILIRUBIN URINE: NEGATIVE MG/DL
BLOOD, URINE: ABNORMAL
CLARITY: ABNORMAL
COLOR: YELLOW
GLUCOSE URINE: NEGATIVE MG/DL
KETONES, URINE: NEGATIVE MG/DL
LEUKOCYTE ESTERASE, URINE: ABNORMAL
NITRITE, URINE: NEGATIVE
PH UA: 6 (ref 5–8)
PROTEIN UA: NEGATIVE MG/DL
SPECIFIC GRAVITY UA: 1.01 (ref 1–1.03)
UROBILINOGEN, URINE: 0.2 E.U./DL

## 2019-08-27 PROCEDURE — 99212 OFFICE O/P EST SF 10 MIN: CPT

## 2019-08-27 PROCEDURE — 81003 URINALYSIS AUTO W/O SCOPE: CPT

## 2019-08-27 PROCEDURE — 99212 OFFICE O/P EST SF 10 MIN: CPT | Performed by: OBSTETRICS & GYNECOLOGY

## 2019-09-05 ENCOUNTER — TELEPHONE (OUTPATIENT)
Dept: OBGYN CLINIC | Age: 29
End: 2019-09-05

## 2019-09-08 ENCOUNTER — HOSPITAL ENCOUNTER (INPATIENT)
Age: 29
LOS: 8 days | Discharge: HOME | DRG: 560 | End: 2019-09-16
Attending: EMERGENCY MEDICINE | Admitting: OBSTETRICS & GYNECOLOGY
Payer: COMMERCIAL

## 2019-09-08 ENCOUNTER — APPOINTMENT (OUTPATIENT)
Dept: GENERAL RADIOLOGY | Age: 29
DRG: 560 | End: 2019-09-08
Payer: COMMERCIAL

## 2019-09-08 ENCOUNTER — APPOINTMENT (OUTPATIENT)
Dept: CT IMAGING | Age: 29
DRG: 560 | End: 2019-09-08
Payer: COMMERCIAL

## 2019-09-08 DIAGNOSIS — O14.13 SEVERE PRE-ECLAMPSIA IN THIRD TRIMESTER: Primary | ICD-10-CM

## 2019-09-08 LAB
A/G RATIO: 0.9 (ref 1.1–2.2)
ACANTHOCYTES: ABNORMAL
ALBUMIN SERPL-MCNC: 2.6 G/DL (ref 3.4–5)
ALP BLD-CCNC: 83 U/L (ref 40–129)
ALT SERPL-CCNC: 27 U/L (ref 10–40)
ANION GAP SERPL CALCULATED.3IONS-SCNC: 11 MMOL/L (ref 3–16)
ANISOCYTOSIS: ABNORMAL
APTT: 29 SEC (ref 26–36)
AST SERPL-CCNC: 21 U/L (ref 15–37)
BACTERIA: ABNORMAL /HPF
BASOPHILS ABSOLUTE: 0 K/UL (ref 0–0.2)
BASOPHILS RELATIVE PERCENT: 0.5 %
BILIRUB SERPL-MCNC: <0.2 MG/DL (ref 0–1)
BILIRUBIN DIRECT: <0.2 MG/DL (ref 0–0.3)
BILIRUBIN URINE: NEGATIVE
BILIRUBIN, INDIRECT: ABNORMAL MG/DL (ref 0–1)
BLOOD, URINE: ABNORMAL
BUN BLDV-MCNC: 12 MG/DL (ref 7–20)
CALCIUM SERPL-MCNC: 7.9 MG/DL (ref 8.3–10.6)
CHLORIDE BLD-SCNC: 106 MMOL/L (ref 99–110)
CLARITY: ABNORMAL
CO2: 19 MMOL/L (ref 21–32)
COLOR: YELLOW
CREAT SERPL-MCNC: 0.7 MG/DL (ref 0.6–1.1)
CREATININE URINE: 73.2 MG/DL (ref 28–259)
D DIMER: 537 NG/ML DDU (ref 0–229)
EKG ATRIAL RATE: 95 BPM
EKG DIAGNOSIS: NORMAL
EKG P AXIS: 48 DEGREES
EKG P-R INTERVAL: 134 MS
EKG Q-T INTERVAL: 350 MS
EKG QRS DURATION: 70 MS
EKG QTC CALCULATION (BAZETT): 439 MS
EKG R AXIS: 43 DEGREES
EKG T AXIS: 57 DEGREES
EKG VENTRICULAR RATE: 95 BPM
EOSINOPHILS ABSOLUTE: 0.1 K/UL (ref 0–0.6)
EOSINOPHILS RELATIVE PERCENT: 1.2 %
EPITHELIAL CELLS, UA: 3 /HPF (ref 0–5)
FIBRINOGEN: 452 MG/DL (ref 200–397)
GFR AFRICAN AMERICAN: >60
GFR NON-AFRICAN AMERICAN: >60
GLOBULIN: 2.8 G/DL
GLUCOSE BLD-MCNC: 88 MG/DL (ref 70–99)
GLUCOSE URINE: NEGATIVE MG/DL
HCT VFR BLD CALC: 28.5 % (ref 36–48)
HEMATOLOGY PATH CONSULT: NO
HEMOGLOBIN: 8.6 G/DL (ref 12–16)
HYALINE CASTS: 5 /LPF (ref 0–8)
HYPOCHROMIA: ABNORMAL
INR BLD: 1.03 (ref 0.86–1.14)
KETONES, URINE: NEGATIVE MG/DL
LACTATE DEHYDROGENASE: 289 U/L (ref 100–190)
LEUKOCYTE ESTERASE, URINE: ABNORMAL
LYMPHOCYTES ABSOLUTE: 2.1 K/UL (ref 1–5.1)
LYMPHOCYTES RELATIVE PERCENT: 33 %
MCH RBC QN AUTO: 19.5 PG (ref 26–34)
MCHC RBC AUTO-ENTMCNC: 30.4 G/DL (ref 31–36)
MCV RBC AUTO: 64.3 FL (ref 80–100)
MICROSCOPIC EXAMINATION: YES
MONOCYTES ABSOLUTE: 0.5 K/UL (ref 0–1.3)
MONOCYTES RELATIVE PERCENT: 7.1 %
MUCUS: ABNORMAL /LPF
NEUTROPHILS ABSOLUTE: 3.7 K/UL (ref 1.7–7.7)
NEUTROPHILS RELATIVE PERCENT: 58.2 %
NITRITE, URINE: NEGATIVE
OVALOCYTES: ABNORMAL
PDW BLD-RTO: 19.3 % (ref 12.4–15.4)
PH UA: 6 (ref 5–8)
PLATELET # BLD: 217 K/UL (ref 135–450)
PLATELET SLIDE REVIEW: ADEQUATE
PMV BLD AUTO: 8.5 FL (ref 5–10.5)
POIKILOCYTES: ABNORMAL
POLYCHROMASIA: ABNORMAL
POTASSIUM SERPL-SCNC: 4 MMOL/L (ref 3.5–5.1)
PROTEIN PROTEIN: 638 MG/DL
PROTEIN UA: >=300 MG/DL
PROTEIN/CREAT RATIO: 8.7 MG/DL
PROTHROMBIN TIME: 11.7 SEC (ref 9.8–13)
RBC # BLD: 4.43 M/UL (ref 4–5.2)
RBC UA: ABNORMAL /HPF (ref 0–2)
SLIDE REVIEW: ABNORMAL
SODIUM BLD-SCNC: 136 MMOL/L (ref 136–145)
SPECIFIC GRAVITY UA: 1.01 (ref 1–1.03)
TARGET CELLS: ABNORMAL
TEAR DROP CELLS: ABNORMAL
TOTAL PROTEIN: 5.4 G/DL (ref 6.4–8.2)
TROPONIN: <0.01 NG/ML
URIC ACID, SERUM: 7.6 MG/DL (ref 2.6–6)
URINE REFLEX TO CULTURE: YES
URINE TYPE: ABNORMAL
UROBILINOGEN, URINE: 0.2 E.U./DL
WBC # BLD: 6.4 K/UL (ref 4–11)
WBC UA: 29 /HPF (ref 0–5)

## 2019-09-08 PROCEDURE — 1220000000 HC SEMI PRIVATE OB R&B

## 2019-09-08 PROCEDURE — 99232 SBSQ HOSP IP/OBS MODERATE 35: CPT | Performed by: OBSTETRICS & GYNECOLOGY

## 2019-09-08 PROCEDURE — 87077 CULTURE AEROBIC IDENTIFY: CPT

## 2019-09-08 PROCEDURE — 83615 LACTATE (LD) (LDH) ENZYME: CPT

## 2019-09-08 PROCEDURE — 71260 CT THORAX DX C+: CPT

## 2019-09-08 PROCEDURE — 81001 URINALYSIS AUTO W/SCOPE: CPT

## 2019-09-08 PROCEDURE — 96375 TX/PRO/DX INJ NEW DRUG ADDON: CPT

## 2019-09-08 PROCEDURE — 6370000000 HC RX 637 (ALT 250 FOR IP): Performed by: OBSTETRICS & GYNECOLOGY

## 2019-09-08 PROCEDURE — 82248 BILIRUBIN DIRECT: CPT

## 2019-09-08 PROCEDURE — 80053 COMPREHEN METABOLIC PANEL: CPT

## 2019-09-08 PROCEDURE — 85025 COMPLETE CBC W/AUTO DIFF WBC: CPT

## 2019-09-08 PROCEDURE — 84550 ASSAY OF BLOOD/URIC ACID: CPT

## 2019-09-08 PROCEDURE — 71045 X-RAY EXAM CHEST 1 VIEW: CPT

## 2019-09-08 PROCEDURE — 93010 ELECTROCARDIOGRAM REPORT: CPT | Performed by: INTERNAL MEDICINE

## 2019-09-08 PROCEDURE — 84156 ASSAY OF PROTEIN URINE: CPT

## 2019-09-08 PROCEDURE — 85730 THROMBOPLASTIN TIME PARTIAL: CPT

## 2019-09-08 PROCEDURE — 6360000002 HC RX W HCPCS: Performed by: EMERGENCY MEDICINE

## 2019-09-08 PROCEDURE — 82570 ASSAY OF URINE CREATININE: CPT

## 2019-09-08 PROCEDURE — 2500000003 HC RX 250 WO HCPCS: Performed by: EMERGENCY MEDICINE

## 2019-09-08 PROCEDURE — 6360000004 HC RX CONTRAST MEDICATION: Performed by: EMERGENCY MEDICINE

## 2019-09-08 PROCEDURE — 93005 ELECTROCARDIOGRAM TRACING: CPT | Performed by: EMERGENCY MEDICINE

## 2019-09-08 PROCEDURE — 85379 FIBRIN DEGRADATION QUANT: CPT

## 2019-09-08 PROCEDURE — 2580000003 HC RX 258: Performed by: OBSTETRICS & GYNECOLOGY

## 2019-09-08 PROCEDURE — 99285 EMERGENCY DEPT VISIT HI MDM: CPT

## 2019-09-08 PROCEDURE — 96365 THER/PROPH/DIAG IV INF INIT: CPT

## 2019-09-08 PROCEDURE — 85610 PROTHROMBIN TIME: CPT

## 2019-09-08 PROCEDURE — 87186 SC STD MICRODIL/AGAR DIL: CPT

## 2019-09-08 PROCEDURE — 85384 FIBRINOGEN ACTIVITY: CPT

## 2019-09-08 PROCEDURE — 59025 FETAL NON-STRESS TEST: CPT

## 2019-09-08 PROCEDURE — 87086 URINE CULTURE/COLONY COUNT: CPT

## 2019-09-08 PROCEDURE — 96376 TX/PRO/DX INJ SAME DRUG ADON: CPT

## 2019-09-08 PROCEDURE — 84484 ASSAY OF TROPONIN QUANT: CPT

## 2019-09-08 PROCEDURE — 6360000002 HC RX W HCPCS: Performed by: OBSTETRICS & GYNECOLOGY

## 2019-09-08 PROCEDURE — 71046 X-RAY EXAM CHEST 2 VIEWS: CPT

## 2019-09-08 RX ORDER — PROMETHAZINE HYDROCHLORIDE 25 MG/ML
12.5 INJECTION, SOLUTION INTRAMUSCULAR; INTRAVENOUS ONCE
Status: DISCONTINUED | OUTPATIENT
Start: 2019-09-08 | End: 2019-09-08

## 2019-09-08 RX ORDER — MEPERIDINE HYDROCHLORIDE 50 MG/ML
50 INJECTION INTRAMUSCULAR; INTRAVENOUS; SUBCUTANEOUS ONCE
Status: COMPLETED | OUTPATIENT
Start: 2019-09-08 | End: 2019-09-08

## 2019-09-08 RX ORDER — LABETALOL 200 MG/1
100 TABLET, FILM COATED ORAL 2 TIMES DAILY
Status: DISCONTINUED | OUTPATIENT
Start: 2019-09-08 | End: 2019-09-09

## 2019-09-08 RX ORDER — ZOLPIDEM TARTRATE 5 MG/1
10 TABLET ORAL ONCE
Status: DISCONTINUED | OUTPATIENT
Start: 2019-09-08 | End: 2019-09-13

## 2019-09-08 RX ORDER — ACETAMINOPHEN 500 MG
1000 TABLET ORAL EVERY 6 HOURS PRN
Status: DISCONTINUED | OUTPATIENT
Start: 2019-09-08 | End: 2019-09-13

## 2019-09-08 RX ORDER — LABETALOL HYDROCHLORIDE 5 MG/ML
10 INJECTION, SOLUTION INTRAVENOUS EVERY 10 MIN PRN
Status: DISCONTINUED | OUTPATIENT
Start: 2019-09-08 | End: 2019-09-16 | Stop reason: HOSPADM

## 2019-09-08 RX ORDER — NIFEDIPINE 30 MG/1
30 TABLET, EXTENDED RELEASE ORAL ONCE
Status: COMPLETED | OUTPATIENT
Start: 2019-09-08 | End: 2019-09-08

## 2019-09-08 RX ORDER — MAGNESIUM SULFATE IN WATER 40 MG/ML
4 INJECTION, SOLUTION INTRAVENOUS ONCE
Status: COMPLETED | OUTPATIENT
Start: 2019-09-08 | End: 2019-09-08

## 2019-09-08 RX ORDER — BETAMETHASONE SODIUM PHOSPHATE AND BETAMETHASONE ACETATE 3; 3 MG/ML; MG/ML
12 INJECTION, SUSPENSION INTRA-ARTICULAR; INTRALESIONAL; INTRAMUSCULAR; SOFT TISSUE EVERY 24 HOURS
Status: COMPLETED | OUTPATIENT
Start: 2019-09-08 | End: 2019-09-09

## 2019-09-08 RX ORDER — SODIUM CHLORIDE 0.9 % (FLUSH) 0.9 %
10 SYRINGE (ML) INJECTION 2 TIMES DAILY
Status: DISCONTINUED | OUTPATIENT
Start: 2019-09-08 | End: 2019-09-16 | Stop reason: HOSPADM

## 2019-09-08 RX ADMIN — LABETALOL HYDROCHLORIDE 10 MG: 5 INJECTION INTRAVENOUS at 11:30

## 2019-09-08 RX ADMIN — LABETALOL HYDROCHLORIDE 100 MG: 200 TABLET, FILM COATED ORAL at 14:16

## 2019-09-08 RX ADMIN — ACETAMINOPHEN 1000 MG: 500 TABLET ORAL at 14:25

## 2019-09-08 RX ADMIN — BETAMETHASONE SODIUM PHOSPHATE AND BETAMETHASONE ACETATE 12 MG: 3; 3 INJECTION, SUSPENSION INTRA-ARTICULAR; INTRALESIONAL; INTRAMUSCULAR at 14:26

## 2019-09-08 RX ADMIN — LABETALOL HYDROCHLORIDE 10 MG: 5 INJECTION INTRAVENOUS at 11:50

## 2019-09-08 RX ADMIN — LABETALOL HYDROCHLORIDE 10 MG: 5 INJECTION INTRAVENOUS at 10:17

## 2019-09-08 RX ADMIN — IOPAMIDOL 75 ML: 755 INJECTION, SOLUTION INTRAVENOUS at 12:24

## 2019-09-08 RX ADMIN — LABETALOL HYDROCHLORIDE 10 MG: 5 INJECTION INTRAVENOUS at 12:54

## 2019-09-08 RX ADMIN — SODIUM CHLORIDE, PRESERVATIVE FREE 10 ML: 5 INJECTION INTRAVENOUS at 14:27

## 2019-09-08 RX ADMIN — MAGNESIUM SULFATE HEPTAHYDRATE 4 G: 40 INJECTION, SOLUTION INTRAVENOUS at 10:38

## 2019-09-08 RX ADMIN — NIFEDIPINE 30 MG: 30 TABLET, FILM COATED, EXTENDED RELEASE ORAL at 15:33

## 2019-09-08 RX ADMIN — Medication 12.5 MG: at 20:12

## 2019-09-08 RX ADMIN — MEPERIDINE HYDROCHLORIDE 50 MG: 50 INJECTION INTRAMUSCULAR; INTRAVENOUS; SUBCUTANEOUS at 20:06

## 2019-09-08 ASSESSMENT — PAIN DESCRIPTION - PAIN TYPE: TYPE: ACUTE PAIN

## 2019-09-08 ASSESSMENT — PAIN - FUNCTIONAL ASSESSMENT: PAIN_FUNCTIONAL_ASSESSMENT: ACTIVITIES ARE NOT PREVENTED

## 2019-09-08 ASSESSMENT — PAIN DESCRIPTION - DESCRIPTORS
DESCRIPTORS: PRESSURE
DESCRIPTORS: ACHING
DESCRIPTORS: SORE
DESCRIPTORS: PRESSURE
DESCRIPTORS: HEAVINESS;PRESSURE

## 2019-09-08 ASSESSMENT — ENCOUNTER SYMPTOMS
EYES NEGATIVE: 1
NAUSEA: 0
FACIAL SWELLING: 0
ABDOMINAL PAIN: 0
COUGH: 0
SHORTNESS OF BREATH: 1
GASTROINTESTINAL NEGATIVE: 1
CHEST TIGHTNESS: 0
VOMITING: 0

## 2019-09-08 ASSESSMENT — PAIN DESCRIPTION - PROGRESSION: CLINICAL_PROGRESSION: NOT CHANGED

## 2019-09-08 ASSESSMENT — PAIN DESCRIPTION - LOCATION: LOCATION: ABDOMEN;PELVIS

## 2019-09-08 ASSESSMENT — PAIN DESCRIPTION - ONSET: ONSET: ON-GOING

## 2019-09-08 ASSESSMENT — PAIN SCALES - GENERAL
PAINLEVEL_OUTOF10: 10
PAINLEVEL_OUTOF10: 5
PAINLEVEL_OUTOF10: 10

## 2019-09-08 ASSESSMENT — PAIN DESCRIPTION - FREQUENCY: FREQUENCY: CONTINUOUS

## 2019-09-08 NOTE — FLOWSHEET NOTE
RN to ER to place patient on [place] and US to monitor fetal heart tones per mobile cart. Patient arrived to ER for c/o SOB and chest pain. Patient is 32w4d. Dr. Henrik Delacruz MD aware of patient arrival and wants patient's initial assessment to be completed in ER to rule out PE. Patient BP elevated and patient has a history of pre-eclampsia with previous pregnancy. Patient c/o headache. Patient states that she has had headache for about a week. Patient states that she has taken tylenol for headache but denies relief. Patient also c/o N/V. Patient denies blurry vision and RUQ pain. Clonus is negative. Edema noted to BLE. Patient states that she has been feeling baby move and denies vaginal bleeding. Patient states that she has noticed a change in the amount of discharge. Dr. Terri Negron notified of patient history and complaints. Order for [facility] labs obtained. [place] and US on and monitoring and fhts will be monitored per central monitoring bank with alarms on.

## 2019-09-08 NOTE — PROGRESS NOTES
House OB    Called to see patient about reporting \"chest pressure, SOB and HA\"    AfVSS BPs 130/90s    Lungs: CTA, some sternal pain recreated with pressure  Heart: RRR    Pulse Ox 100% on room air    A/P IUP at 32.4 wks   Mild preeclampsi r/o severe   Suspect above c/os to more musculoskeletal. Does not appear to be SOB and pulse ox is good   Told her family they should wrap up visit and give her some peace so she can rest. 6 family members including 3 toddlers in the room and she really needs the rest.    Will try DemerolPhenergan for HA. She was started on Labetalol 100 mg bid po. Received procardia XL 30 x1 earlier   24 hour urine in progress. S/p steroids x 1 repeat tomorrow.     PEP labs ordered for AM.

## 2019-09-08 NOTE — ED NOTES
Pt presents to ED via walk-in from home. Pt c/o SOB and L sided chest pain that started this AM 0500. HA for weeks and progressively worsened. Denies blurred vision. Pt states she is 32 weeks pregnant with hx of preeclampsia with previous pregnancy. Pt states she was induced at 38 weeks.       Ruby Gayle RN  09/08/19 [id number]

## 2019-09-08 NOTE — ED PROVIDER NOTES
mg Oral Given 19 1416)   betamethasone acetate-betamethasone sodium phosphate (CELESTONE) injection 12 mg (12 mg Intramuscular Given 19 1426)   acetaminophen (TYLENOL) tablet 1,000 mg (1,000 mg Oral Given 19 1425)   sodium chloride flush 0.9 % injection 10 mL (10 mLs Intravenous Given 19 1427)   NIFEdipine (PROCARDIA XL) extended release tablet 30 mg (has no administration in time range)   magnesium sulfate 4 g in 100 mL IVPB premix (0 g Intravenous Stopped 19 1115)   iopamidol (ISOVUE-370) 76 % injection 75 mL (75 mLs Intravenous Given 19 1224)       61-year-old female, , 32 weeks by last ultrasound, presenting due to shortness of breath, as well as chest pains and headaches. She is hypertensive on arrival with systolics in the 658L, repeated several times for confirmation. In this setting, with history of preeclampsia, concerning for preeclampsia with severe features. IV access obtained, and lab work sent including LFTs, uric acid, LDH, and urinalysis with urine protein to creatinine ratio sent. These are remarkable for some elevations consistent with preeclampsia including elevated uric acid of 7.6, LDH elevated as well, with urine protein to creatinine ratio and urinalysis protein showing elevation. Platelets are decreased from normal of 300 into the 200s however not in a concerning range. D-dimer was ordered by labor and delivery staff, returned elevated. This is not an accurate test in pregnancy so I am unsure how to interpret this however in the setting of some shortness of breath, with tachycardia and because she is pregnant, unable to use clinical decision rules to rule out PE. Discussed the risks and benefits of evaluating for PE with CT angiogram, and patient is willing to accept the risks. CT angiogram showing no clear evidence of PE. Patient given labetalol for control of her hypertension in pregnancy, requiring multiple doses of labetalol IV in the ED. With these lab derangements, persistently elevated blood pressures, headache, continue to suspect preeclampsia with severe features. Initiated magnesium 4 g over 20 minutes, given in the ED. Discussed with on-call obstetrician, Dr Duy Perez, who agreed to accept to labor and delivery service for further evaluation for preeclampsia for possible delivery. FINAL IMPRESSION      1. Severe pre-eclampsia in third trimester          DISPOSITION/PLAN   DISPOSITION Decision To Admit 09/08/2019 01:42:17 PM      PATIENT REFERRED TO:  No follow-up provider specified.     DISCHARGE MEDICATIONS:  Current Discharge Medication List          DISCONTINUED MEDICATIONS:  Current Discharge Medication List                 (Please note that portions of this note were completed with a voice recognition program.  Efforts were made to editthe dictations but occasionally words are mis-transcribed.)    Heavenly Perez MD (electronically signed)            Heavenly Perez MD  09/08/19 Gino Salazar MD  09/08/19 1417       Heavenly Perez MD  09/08/19 3150

## 2019-09-08 NOTE — ED PROVIDER NOTES
showing gestational sac with at least yolk sac, likely fetal pole by my interpretation, feel that patient has confirmed IUP and her symptoms are well controlled by IV fluids. She was able to ambulate around the emergency department with no significant tachycardia or desaturations. Feel that she is stable for discharge at this time as we have talked to her obstetrician who is willing to see her in follow-up and she is scheduled for a confirmatory outpatient complete ultrasound. Instructed her return for any concerning signs for ectopic such as severe abdominal pain, vaginal bleeding.         Marry Ayala MD  Attending Emergency Physician         Marry Ayala MD  09/08/19 7970

## 2019-09-09 ENCOUNTER — ANESTHESIA (OUTPATIENT)
Dept: LABOR AND DELIVERY | Age: 29
End: 2019-09-09

## 2019-09-09 ENCOUNTER — APPOINTMENT (OUTPATIENT)
Dept: ULTRASOUND IMAGING | Age: 29
DRG: 560 | End: 2019-09-09
Payer: COMMERCIAL

## 2019-09-09 ENCOUNTER — ANESTHESIA EVENT (OUTPATIENT)
Dept: LABOR AND DELIVERY | Age: 29
End: 2019-09-09

## 2019-09-09 LAB
24HR URINE VOLUME (ML): 1500 ML
A/G RATIO: 0.9 (ref 1.1–2.2)
ACANTHOCYTES: ABNORMAL
ALBUMIN SERPL-MCNC: 2.9 G/DL (ref 3.4–5)
ALP BLD-CCNC: 88 U/L (ref 40–129)
ALT SERPL-CCNC: 25 U/L (ref 10–40)
ANION GAP SERPL CALCULATED.3IONS-SCNC: 15 MMOL/L (ref 3–16)
ANISOCYTOSIS: ABNORMAL
AST SERPL-CCNC: 17 U/L (ref 15–37)
BASOPHILS ABSOLUTE: 0 K/UL (ref 0–0.2)
BASOPHILS RELATIVE PERCENT: 0 %
BILIRUB SERPL-MCNC: <0.2 MG/DL (ref 0–1)
BUN BLDV-MCNC: 15 MG/DL (ref 7–20)
CALCIUM SERPL-MCNC: 8.2 MG/DL (ref 8.3–10.6)
CHLORIDE BLD-SCNC: 104 MMOL/L (ref 99–110)
CO2: 18 MMOL/L (ref 21–32)
CREAT SERPL-MCNC: 0.7 MG/DL (ref 0.6–1.1)
CREATININE 24 HOUR URINE: 1.4 G/24HR (ref 0.6–1.5)
EOSINOPHILS ABSOLUTE: 0 K/UL (ref 0–0.6)
EOSINOPHILS RELATIVE PERCENT: 0 %
GFR AFRICAN AMERICAN: >60
GFR NON-AFRICAN AMERICAN: >60
GLOBULIN: 3.1 G/DL
GLUCOSE BLD-MCNC: 126 MG/DL (ref 70–99)
HCT VFR BLD CALC: 31.2 % (ref 36–48)
HEMATOLOGY PATH CONSULT: NO
HEMOGLOBIN: 9.5 G/DL (ref 12–16)
HYPOCHROMIA: ABNORMAL
LYMPHOCYTES ABSOLUTE: 1 K/UL (ref 1–5.1)
LYMPHOCYTES RELATIVE PERCENT: 10 %
MCH RBC QN AUTO: 19.5 PG (ref 26–34)
MCHC RBC AUTO-ENTMCNC: 30.5 G/DL (ref 31–36)
MCV RBC AUTO: 64 FL (ref 80–100)
MICROCYTES: ABNORMAL
MONOCYTES ABSOLUTE: 0.2 K/UL (ref 0–1.3)
MONOCYTES RELATIVE PERCENT: 2 %
NEUTROPHILS ABSOLUTE: 9 K/UL (ref 1.7–7.7)
NEUTROPHILS RELATIVE PERCENT: 88 %
OVALOCYTES: ABNORMAL
PDW BLD-RTO: 19.2 % (ref 12.4–15.4)
PLATELET # BLD: 264 K/UL (ref 135–450)
PMV BLD AUTO: 8.7 FL (ref 5–10.5)
POIKILOCYTES: ABNORMAL
POLYCHROMASIA: ABNORMAL
POTASSIUM SERPL-SCNC: 4.3 MMOL/L (ref 3.5–5.1)
PROTEIN 24 HOUR URINE: 6.12 G/24HR
RBC # BLD: 4.87 M/UL (ref 4–5.2)
SCHISTOCYTES: ABNORMAL
SODIUM BLD-SCNC: 137 MMOL/L (ref 136–145)
TEAR DROP CELLS: ABNORMAL
TOTAL PROTEIN: 6 G/DL (ref 6.4–8.2)
URIC ACID, SERUM: 7.5 MG/DL (ref 2.6–6)
WBC # BLD: 10.2 K/UL (ref 4–11)

## 2019-09-09 PROCEDURE — 76805 OB US >/= 14 WKS SNGL FETUS: CPT

## 2019-09-09 PROCEDURE — 6370000000 HC RX 637 (ALT 250 FOR IP): Performed by: OBSTETRICS & GYNECOLOGY

## 2019-09-09 PROCEDURE — 93975 VASCULAR STUDY: CPT

## 2019-09-09 PROCEDURE — 99225 PR SBSQ OBSERVATION CARE/DAY 25 MINUTES: CPT | Performed by: OBSTETRICS & GYNECOLOGY

## 2019-09-09 PROCEDURE — 59025 FETAL NON-STRESS TEST: CPT

## 2019-09-09 PROCEDURE — 84550 ASSAY OF BLOOD/URIC ACID: CPT

## 2019-09-09 PROCEDURE — 76819 FETAL BIOPHYS PROFIL W/O NST: CPT

## 2019-09-09 PROCEDURE — 36415 COLL VENOUS BLD VENIPUNCTURE: CPT

## 2019-09-09 PROCEDURE — 59025 FETAL NON-STRESS TEST: CPT | Performed by: OBSTETRICS & GYNECOLOGY

## 2019-09-09 PROCEDURE — 6360000002 HC RX W HCPCS: Performed by: OBSTETRICS & GYNECOLOGY

## 2019-09-09 PROCEDURE — 1220000000 HC SEMI PRIVATE OB R&B

## 2019-09-09 PROCEDURE — 85025 COMPLETE CBC W/AUTO DIFF WBC: CPT

## 2019-09-09 PROCEDURE — 80053 COMPREHEN METABOLIC PANEL: CPT

## 2019-09-09 PROCEDURE — 2580000003 HC RX 258: Performed by: OBSTETRICS & GYNECOLOGY

## 2019-09-09 RX ORDER — LABETALOL 200 MG/1
100 TABLET, FILM COATED ORAL 2 TIMES DAILY
Status: DISCONTINUED | OUTPATIENT
Start: 2019-09-10 | End: 2019-09-10

## 2019-09-09 RX ORDER — FERROUS SULFATE TAB EC 324 MG (65 MG FE EQUIVALENT) 324 (65 FE) MG
324 TABLET DELAYED RESPONSE ORAL
Status: DISCONTINUED | OUTPATIENT
Start: 2019-09-10 | End: 2019-09-16 | Stop reason: HOSPADM

## 2019-09-09 RX ORDER — PRENATAL VIT/IRON FUM/FOLIC AC 27MG-0.8MG
1 TABLET ORAL DAILY
Status: DISCONTINUED | OUTPATIENT
Start: 2019-09-09 | End: 2019-09-13

## 2019-09-09 RX ORDER — METRONIDAZOLE 500 MG/1
500 TABLET ORAL 2 TIMES DAILY
Status: DISCONTINUED | OUTPATIENT
Start: 2019-09-09 | End: 2019-09-11 | Stop reason: ALTCHOICE

## 2019-09-09 RX ADMIN — BETAMETHASONE SODIUM PHOSPHATE AND BETAMETHASONE ACETATE 12 MG: 3; 3 INJECTION, SUSPENSION INTRA-ARTICULAR; INTRALESIONAL; INTRAMUSCULAR at 15:28

## 2019-09-09 RX ADMIN — ACETAMINOPHEN 1000 MG: 500 TABLET ORAL at 02:27

## 2019-09-09 RX ADMIN — METRONIDAZOLE 500 MG: 500 TABLET ORAL at 09:32

## 2019-09-09 RX ADMIN — LABETALOL HYDROCHLORIDE 100 MG: 200 TABLET, FILM COATED ORAL at 19:01

## 2019-09-09 RX ADMIN — PRENATAL VIT W/ FE FUMARATE-FA TAB 27-0.8 MG 1 TABLET: 27-0.8 TAB at 15:37

## 2019-09-09 RX ADMIN — SODIUM CHLORIDE, PRESERVATIVE FREE 10 ML: 5 INJECTION INTRAVENOUS at 04:22

## 2019-09-09 RX ADMIN — SODIUM CHLORIDE, PRESERVATIVE FREE 10 ML: 5 INJECTION INTRAVENOUS at 09:32

## 2019-09-09 RX ADMIN — SODIUM CHLORIDE, PRESERVATIVE FREE 10 ML: 5 INJECTION INTRAVENOUS at 23:03

## 2019-09-09 RX ADMIN — METRONIDAZOLE 500 MG: 500 TABLET ORAL at 23:02

## 2019-09-09 ASSESSMENT — PAIN DESCRIPTION - DESCRIPTORS
DESCRIPTORS: ACHING

## 2019-09-09 ASSESSMENT — PAIN SCALES - GENERAL: PAINLEVEL_OUTOF10: 7

## 2019-09-10 PROBLEM — O14.93 PREECLAMPSIA, THIRD TRIMESTER: Status: ACTIVE | Noted: 2019-09-10

## 2019-09-10 LAB
A/G RATIO: 1 (ref 1.1–2.2)
ALBUMIN SERPL-MCNC: 2.9 G/DL (ref 3.4–5)
ALP BLD-CCNC: 83 U/L (ref 40–129)
ALT SERPL-CCNC: 23 U/L (ref 10–40)
ANION GAP SERPL CALCULATED.3IONS-SCNC: 13 MMOL/L (ref 3–16)
APTT: 26.8 SEC (ref 26–36)
AST SERPL-CCNC: 22 U/L (ref 15–37)
BILIRUB SERPL-MCNC: <0.2 MG/DL (ref 0–1)
BUN BLDV-MCNC: 15 MG/DL (ref 7–20)
CALCIUM SERPL-MCNC: 8.3 MG/DL (ref 8.3–10.6)
CHLORIDE BLD-SCNC: 104 MMOL/L (ref 99–110)
CO2: 20 MMOL/L (ref 21–32)
CREAT SERPL-MCNC: 0.5 MG/DL (ref 0.6–1.1)
FIBRINOGEN: 356 MG/DL (ref 200–397)
GFR AFRICAN AMERICAN: >60
GFR NON-AFRICAN AMERICAN: >60
GLOBULIN: 3 G/DL
GLUCOSE BLD-MCNC: 103 MG/DL (ref 70–99)
INR BLD: 1 (ref 0.86–1.14)
ORGANISM: ABNORMAL
ORGANISM: ABNORMAL
POTASSIUM SERPL-SCNC: 4.1 MMOL/L (ref 3.5–5.1)
PROTHROMBIN TIME: 11.4 SEC (ref 9.8–13)
SODIUM BLD-SCNC: 137 MMOL/L (ref 136–145)
TOTAL PROTEIN: 5.9 G/DL (ref 6.4–8.2)
URIC ACID, SERUM: 6.6 MG/DL (ref 2.6–6)
URINE CULTURE, ROUTINE: ABNORMAL

## 2019-09-10 PROCEDURE — 80053 COMPREHEN METABOLIC PANEL: CPT

## 2019-09-10 PROCEDURE — 1220000000 HC SEMI PRIVATE OB R&B

## 2019-09-10 PROCEDURE — 85730 THROMBOPLASTIN TIME PARTIAL: CPT

## 2019-09-10 PROCEDURE — 84550 ASSAY OF BLOOD/URIC ACID: CPT

## 2019-09-10 PROCEDURE — 36415 COLL VENOUS BLD VENIPUNCTURE: CPT

## 2019-09-10 PROCEDURE — 6370000000 HC RX 637 (ALT 250 FOR IP): Performed by: OBSTETRICS & GYNECOLOGY

## 2019-09-10 PROCEDURE — 85384 FIBRINOGEN ACTIVITY: CPT

## 2019-09-10 PROCEDURE — 2580000003 HC RX 258: Performed by: OBSTETRICS & GYNECOLOGY

## 2019-09-10 PROCEDURE — 85610 PROTHROMBIN TIME: CPT

## 2019-09-10 PROCEDURE — 85025 COMPLETE CBC W/AUTO DIFF WBC: CPT

## 2019-09-10 RX ORDER — NIFEDIPINE 10 MG/1
20 CAPSULE ORAL ONCE
Status: COMPLETED | OUTPATIENT
Start: 2019-09-10 | End: 2019-09-10

## 2019-09-10 RX ORDER — LABETALOL 200 MG/1
200 TABLET, FILM COATED ORAL 2 TIMES DAILY
Status: DISCONTINUED | OUTPATIENT
Start: 2019-09-10 | End: 2019-09-11 | Stop reason: DRUGHIGH

## 2019-09-10 RX ORDER — ONDANSETRON 2 MG/ML
8 INJECTION INTRAMUSCULAR; INTRAVENOUS EVERY 6 HOURS PRN
Status: DISCONTINUED | OUTPATIENT
Start: 2019-09-10 | End: 2019-09-16 | Stop reason: HOSPADM

## 2019-09-10 RX ADMIN — METRONIDAZOLE 500 MG: 500 TABLET ORAL at 08:48

## 2019-09-10 RX ADMIN — NIFEDIPINE 20 MG: 10 CAPSULE ORAL at 18:50

## 2019-09-10 RX ADMIN — FERROUS SULFATE TAB EC 324 MG (65 MG FE EQUIVALENT) 324 MG: 324 (65 FE) TABLET DELAYED RESPONSE at 08:48

## 2019-09-10 RX ADMIN — PRENATAL VIT W/ FE FUMARATE-FA TAB 27-0.8 MG 1 TABLET: 27-0.8 TAB at 08:48

## 2019-09-10 RX ADMIN — LABETALOL HYDROCHLORIDE 100 MG: 200 TABLET, FILM COATED ORAL at 21:19

## 2019-09-10 RX ADMIN — ACETAMINOPHEN 1000 MG: 500 TABLET ORAL at 17:14

## 2019-09-10 RX ADMIN — SODIUM CHLORIDE, PRESERVATIVE FREE 10 ML: 5 INJECTION INTRAVENOUS at 21:00

## 2019-09-10 RX ADMIN — LABETALOL HYDROCHLORIDE 100 MG: 200 TABLET, FILM COATED ORAL at 08:49

## 2019-09-10 RX ADMIN — METRONIDAZOLE 500 MG: 500 TABLET ORAL at 21:18

## 2019-09-10 ASSESSMENT — PAIN DESCRIPTION - DESCRIPTORS
DESCRIPTORS: HEADACHE
DESCRIPTORS: ACHING
DESCRIPTORS: ACHING
DESCRIPTORS: HEADACHE

## 2019-09-10 ASSESSMENT — PAIN SCALES - GENERAL: PAINLEVEL_OUTOF10: 8

## 2019-09-10 NOTE — PROGRESS NOTES
Pateint resting in bed. Family at bedside. Assessment unremarkable with the exception of /103 and a headache. BP within ordered parameters of 160/105 x 2 thirty minutes apart. Patient states that she has had an ongoing headache for the last couple weeks with no response to medication. Patient declines pain medicine for her headache at this time. Patient declines sleeping pill as well. Urine output sufficient. Will re-evaluate blood pressure once family has left and patient is resting. Will continue to monitor.

## 2019-09-11 ENCOUNTER — APPOINTMENT (OUTPATIENT)
Dept: ULTRASOUND IMAGING | Age: 29
DRG: 560 | End: 2019-09-11
Payer: COMMERCIAL

## 2019-09-11 PROBLEM — O14.13 SEVERE PRE-ECLAMPSIA IN THIRD TRIMESTER: Status: ACTIVE | Noted: 2019-09-10

## 2019-09-11 LAB
A/G RATIO: 1 (ref 1.1–2.2)
ACANTHOCYTES: ABNORMAL
ALBUMIN SERPL-MCNC: 2.7 G/DL (ref 3.4–5)
ALP BLD-CCNC: 76 U/L (ref 40–129)
ALT SERPL-CCNC: 25 U/L (ref 10–40)
AMPHETAMINE SCREEN, URINE: NORMAL
ANION GAP SERPL CALCULATED.3IONS-SCNC: 13 MMOL/L (ref 3–16)
ANISOCYTOSIS: ABNORMAL
ANISOCYTOSIS: ABNORMAL
AST SERPL-CCNC: 24 U/L (ref 15–37)
ATYPICAL LYMPHOCYTE RELATIVE PERCENT: 1 % (ref 0–6)
BANDED NEUTROPHILS RELATIVE PERCENT: 1 % (ref 0–7)
BANDED NEUTROPHILS RELATIVE PERCENT: 1 % (ref 0–7)
BARBITURATE SCREEN URINE: NORMAL
BASOPHILS ABSOLUTE: 0 K/UL (ref 0–0.2)
BASOPHILS ABSOLUTE: 0 K/UL (ref 0–0.2)
BASOPHILS RELATIVE PERCENT: 0 %
BASOPHILS RELATIVE PERCENT: 0 %
BENZODIAZEPINE SCREEN, URINE: NORMAL
BILIRUB SERPL-MCNC: <0.2 MG/DL (ref 0–1)
BUN BLDV-MCNC: 14 MG/DL (ref 7–20)
BUPRENORPHINE URINE: NORMAL
CALCIUM SERPL-MCNC: 7.9 MG/DL (ref 8.3–10.6)
CANNABINOID SCREEN URINE: NORMAL
CHLORIDE BLD-SCNC: 105 MMOL/L (ref 99–110)
CO2: 21 MMOL/L (ref 21–32)
COCAINE METABOLITE SCREEN URINE: NORMAL
CREAT SERPL-MCNC: 0.6 MG/DL (ref 0.6–1.1)
EOSINOPHILS ABSOLUTE: 0 K/UL (ref 0–0.6)
EOSINOPHILS ABSOLUTE: 0 K/UL (ref 0–0.6)
EOSINOPHILS RELATIVE PERCENT: 0 %
EOSINOPHILS RELATIVE PERCENT: 0 %
GFR AFRICAN AMERICAN: >60
GFR NON-AFRICAN AMERICAN: >60
GLOBULIN: 2.7 G/DL
GLUCOSE BLD-MCNC: 97 MG/DL (ref 70–99)
HCT VFR BLD CALC: 28.4 % (ref 36–48)
HCT VFR BLD CALC: 28.8 % (ref 36–48)
HEMATOLOGY PATH CONSULT: NO
HEMATOLOGY PATH CONSULT: NO
HEMOGLOBIN: 8.8 G/DL (ref 12–16)
HEMOGLOBIN: 8.9 G/DL (ref 12–16)
LYMPHOCYTES ABSOLUTE: 1.2 K/UL (ref 1–5.1)
LYMPHOCYTES ABSOLUTE: 1.7 K/UL (ref 1–5.1)
LYMPHOCYTES RELATIVE PERCENT: 10 %
LYMPHOCYTES RELATIVE PERCENT: 16 %
Lab: NORMAL
MCH RBC QN AUTO: 19.2 PG (ref 26–34)
MCH RBC QN AUTO: 19.5 PG (ref 26–34)
MCHC RBC AUTO-ENTMCNC: 30.8 G/DL (ref 31–36)
MCHC RBC AUTO-ENTMCNC: 31.2 G/DL (ref 31–36)
MCV RBC AUTO: 62.3 FL (ref 80–100)
MCV RBC AUTO: 62.5 FL (ref 80–100)
METHADONE SCREEN, URINE: NORMAL
MICROCYTES: ABNORMAL
MICROCYTES: ABNORMAL
MONOCYTES ABSOLUTE: 0.4 K/UL (ref 0–1.3)
MONOCYTES ABSOLUTE: 0.4 K/UL (ref 0–1.3)
MONOCYTES RELATIVE PERCENT: 3 %
MONOCYTES RELATIVE PERCENT: 4 %
NEUTROPHILS ABSOLUTE: 10.4 K/UL (ref 1.7–7.7)
NEUTROPHILS ABSOLUTE: 7.9 K/UL (ref 1.7–7.7)
NEUTROPHILS RELATIVE PERCENT: 78 %
NEUTROPHILS RELATIVE PERCENT: 86 %
NUCLEATED RED BLOOD CELLS: 2 /100 WBC
NUCLEATED RED BLOOD CELLS: 2 /100 WBC
OPIATE SCREEN URINE: NORMAL
OVALOCYTES: ABNORMAL
OXYCODONE URINE: NORMAL
PDW BLD-RTO: 19.4 % (ref 12.4–15.4)
PDW BLD-RTO: 19.9 % (ref 12.4–15.4)
PH UA: 5
PHENCYCLIDINE SCREEN URINE: NORMAL
PLATELET # BLD: 285 K/UL (ref 135–450)
PLATELET # BLD: 295 K/UL (ref 135–450)
PMV BLD AUTO: 8.9 FL (ref 5–10.5)
PMV BLD AUTO: 9 FL (ref 5–10.5)
POIKILOCYTES: ABNORMAL
POLYCHROMASIA: ABNORMAL
POTASSIUM SERPL-SCNC: 4.5 MMOL/L (ref 3.5–5.1)
PROPOXYPHENE SCREEN: NORMAL
RBC # BLD: 4.54 M/UL (ref 4–5.2)
RBC # BLD: 4.62 M/UL (ref 4–5.2)
SODIUM BLD-SCNC: 139 MMOL/L (ref 136–145)
TARGET CELLS: ABNORMAL
TOTAL PROTEIN: 5.4 G/DL (ref 6.4–8.2)
URIC ACID, SERUM: 6.6 MG/DL (ref 2.6–6)
WBC # BLD: 10 K/UL (ref 4–11)
WBC # BLD: 12 K/UL (ref 4–11)

## 2019-09-11 PROCEDURE — 2580000003 HC RX 258: Performed by: OBSTETRICS & GYNECOLOGY

## 2019-09-11 PROCEDURE — 6370000000 HC RX 637 (ALT 250 FOR IP): Performed by: OBSTETRICS & GYNECOLOGY

## 2019-09-11 PROCEDURE — 76819 FETAL BIOPHYS PROFIL W/O NST: CPT

## 2019-09-11 PROCEDURE — 1220000000 HC SEMI PRIVATE OB R&B

## 2019-09-11 PROCEDURE — 93975 VASCULAR STUDY: CPT

## 2019-09-11 PROCEDURE — 80307 DRUG TEST PRSMV CHEM ANLYZR: CPT

## 2019-09-11 PROCEDURE — 99232 SBSQ HOSP IP/OBS MODERATE 35: CPT | Performed by: OBSTETRICS & GYNECOLOGY

## 2019-09-11 PROCEDURE — 36415 COLL VENOUS BLD VENIPUNCTURE: CPT

## 2019-09-11 PROCEDURE — 85025 COMPLETE CBC W/AUTO DIFF WBC: CPT

## 2019-09-11 PROCEDURE — 84550 ASSAY OF BLOOD/URIC ACID: CPT

## 2019-09-11 PROCEDURE — 59025 FETAL NON-STRESS TEST: CPT | Performed by: OBSTETRICS & GYNECOLOGY

## 2019-09-11 PROCEDURE — 80053 COMPREHEN METABOLIC PANEL: CPT

## 2019-09-11 RX ORDER — NIFEDIPINE 30 MG/1
30 TABLET, EXTENDED RELEASE ORAL DAILY
Status: DISCONTINUED | OUTPATIENT
Start: 2019-09-11 | End: 2019-09-12

## 2019-09-11 RX ORDER — NIFEDIPINE 10 MG/1
10 CAPSULE ORAL ONCE
Status: COMPLETED | OUTPATIENT
Start: 2019-09-11 | End: 2019-09-11

## 2019-09-11 RX ORDER — NIFEDIPINE 10 MG/1
20 CAPSULE ORAL ONCE
Status: COMPLETED | OUTPATIENT
Start: 2019-09-11 | End: 2019-09-11

## 2019-09-11 RX ORDER — LABETALOL 200 MG/1
400 TABLET, FILM COATED ORAL EVERY 12 HOURS SCHEDULED
Status: DISCONTINUED | OUTPATIENT
Start: 2019-09-11 | End: 2019-09-13

## 2019-09-11 RX ORDER — LABETALOL 200 MG/1
200 TABLET, FILM COATED ORAL ONCE
Status: COMPLETED | OUTPATIENT
Start: 2019-09-11 | End: 2019-09-11

## 2019-09-11 RX ORDER — NITROFURANTOIN 25; 75 MG/1; MG/1
100 CAPSULE ORAL EVERY 12 HOURS SCHEDULED
Status: DISPENSED | OUTPATIENT
Start: 2019-09-11 | End: 2019-09-16

## 2019-09-11 RX ADMIN — SODIUM CHLORIDE, PRESERVATIVE FREE 10 ML: 5 INJECTION INTRAVENOUS at 20:55

## 2019-09-11 RX ADMIN — NIFEDIPINE 30 MG: 30 TABLET, FILM COATED, EXTENDED RELEASE ORAL at 10:34

## 2019-09-11 RX ADMIN — PRENATAL VIT W/ FE FUMARATE-FA TAB 27-0.8 MG 1 TABLET: 27-0.8 TAB at 19:29

## 2019-09-11 RX ADMIN — NITROFURANTOIN MONOHYDRATE/MACROCRYSTALLINE 100 MG: 25; 75 CAPSULE ORAL at 08:50

## 2019-09-11 RX ADMIN — FERROUS SULFATE TAB EC 324 MG (65 MG FE EQUIVALENT) 324 MG: 324 (65 FE) TABLET DELAYED RESPONSE at 08:49

## 2019-09-11 RX ADMIN — LABETALOL HYDROCHLORIDE 400 MG: 200 TABLET, FILM COATED ORAL at 20:56

## 2019-09-11 RX ADMIN — NIFEDIPINE 20 MG: 10 CAPSULE ORAL at 01:26

## 2019-09-11 RX ADMIN — NITROFURANTOIN MONOHYDRATE/MACROCRYSTALLINE 100 MG: 25; 75 CAPSULE ORAL at 20:56

## 2019-09-11 RX ADMIN — LABETALOL HYDROCHLORIDE 200 MG: 200 TABLET, FILM COATED ORAL at 10:32

## 2019-09-11 RX ADMIN — NIFEDIPINE 10 MG: 10 CAPSULE ORAL at 11:53

## 2019-09-11 RX ADMIN — LABETALOL HYDROCHLORIDE 200 MG: 200 TABLET, FILM COATED ORAL at 08:50

## 2019-09-11 RX ADMIN — ACETAMINOPHEN 1000 MG: 500 TABLET ORAL at 01:26

## 2019-09-11 RX ADMIN — SODIUM CHLORIDE, PRESERVATIVE FREE 10 ML: 5 INJECTION INTRAVENOUS at 10:20

## 2019-09-11 ASSESSMENT — PAIN DESCRIPTION - DESCRIPTORS
DESCRIPTORS: OTHER (COMMENT)
DESCRIPTORS: HEADACHE
DESCRIPTORS: CONSTANT;DULL;HEADACHE
DESCRIPTORS: HEADACHE

## 2019-09-11 ASSESSMENT — PAIN DESCRIPTION - PAIN TYPE
TYPE: ACUTE PAIN
TYPE: ACUTE PAIN

## 2019-09-11 ASSESSMENT — PAIN SCALES - GENERAL
PAINLEVEL_OUTOF10: 7
PAINLEVEL_OUTOF10: 8
PAINLEVEL_OUTOF10: 7

## 2019-09-11 ASSESSMENT — PAIN DESCRIPTION - ONSET
ONSET: ON-GOING
ONSET: ON-GOING

## 2019-09-11 ASSESSMENT — PAIN DESCRIPTION - LOCATION
LOCATION: HEAD
LOCATION: HEAD

## 2019-09-11 ASSESSMENT — PAIN DESCRIPTION - FREQUENCY
FREQUENCY: CONTINUOUS
FREQUENCY: CONTINUOUS

## 2019-09-11 ASSESSMENT — PAIN DESCRIPTION - PROGRESSION
CLINICAL_PROGRESSION: NOT CHANGED
CLINICAL_PROGRESSION: GRADUALLY WORSENING

## 2019-09-11 NOTE — FLOWSHEET NOTE
Pt sleeping, pt awaken for BP, weight and labs. Pt denies any pain or concerns at this time. Ice and cranberry juice given as requested.

## 2019-09-11 NOTE — FLOWSHEET NOTE
RN placed call to Dr. Margot Hodgkins to update on Pt's status and vitals. Dr. Margot Hodgkins said to only give 100 mg of labetalol at this time and to hold the other 100 mg.

## 2019-09-11 NOTE — FLOWSHEET NOTE
RN notified Dr. Abi Bal of Pt's blood pressure. Dr. Abi Bal stated to call her after 2100 vitals ans assessments.

## 2019-09-11 NOTE — PROGRESS NOTES
Department of Obstetrics and Gynecology  Fetal surveillance testing summary    INDICATIONS: preeclampsia with severe features     OBJECTIVE RESULTS:  Time of the test: 5869-4170    Fetal surveillance: 140, Non reactive BPP to follow     Electronically signed by Madhavi Neff MD on 9/11/2019 at 1:46 PM

## 2019-09-11 NOTE — FLOWSHEET NOTE
09/11/19 0917   Findings   Baseline 135 BPM   Baseline Classification Normal   Variability 6-25 BPM   Decelerations None   Accelerations No   Acoustic Stimulator No   Fetal Movement Yes   Multiple Gestation? No   Uterine contractions/10 min.  2

## 2019-09-11 NOTE — PROGRESS NOTES
Blood pressure has respodnded beautifully to Procardia. Will keep next dose of labetolol at 100mg so that we dont drop her blood pressure too far. Labs are improved with the exception of Hgb going from 9.5 to 8.9. Patient has unusual smear on CBC, but I have reviewed her records and she has had a bone marrow/ peripheral smear in 2014 which was consistent with iron deficiency anemia, bu thalasemia was another possibility. Would consider iron infusion while in house     Will watch carefully. ..at this time labs, blood pressures and symptoms are improved.   However, patient would be classified as severe preeclampsia given blood pressure elevations this afternoon and the 6 grams of protein in her urine    Patient is also GBS positive in her urine and she has E Coli UTI for which she is receiving Flagyl

## 2019-09-11 NOTE — FLOWSHEET NOTE
Report given to Levi Moe RN. RN will resume Pt care. Pt in be resting with eyes closed. Respirations easy, unlabored, and WNL.  Unable to assess pain at this time

## 2019-09-12 ENCOUNTER — APPOINTMENT (OUTPATIENT)
Dept: ULTRASOUND IMAGING | Age: 29
DRG: 560 | End: 2019-09-12
Payer: COMMERCIAL

## 2019-09-12 LAB
A/G RATIO: 1.2 (ref 1.1–2.2)
ACANTHOCYTES: ABNORMAL
ALBUMIN SERPL-MCNC: 2.7 G/DL (ref 3.4–5)
ALP BLD-CCNC: 84 U/L (ref 40–129)
ALT SERPL-CCNC: 24 U/L (ref 10–40)
ANION GAP SERPL CALCULATED.3IONS-SCNC: 16 MMOL/L (ref 3–16)
ANISOCYTOSIS: ABNORMAL
APTT: 25.4 SEC (ref 26–36)
AST SERPL-CCNC: 20 U/L (ref 15–37)
BANDED NEUTROPHILS RELATIVE PERCENT: 0 % (ref 0–7)
BASOPHILS ABSOLUTE: 0.1 K/UL (ref 0–0.2)
BASOPHILS RELATIVE PERCENT: 1 %
BILIRUB SERPL-MCNC: <0.2 MG/DL (ref 0–1)
BILIRUBIN URINE: NEGATIVE
BLOOD, URINE: ABNORMAL
BUN BLDV-MCNC: 14 MG/DL (ref 7–20)
CALCIUM SERPL-MCNC: 8.6 MG/DL (ref 8.3–10.6)
CHLORIDE BLD-SCNC: 101 MMOL/L (ref 99–110)
CLARITY: ABNORMAL
CO2: 19 MMOL/L (ref 21–32)
COLOR: YELLOW
CREAT SERPL-MCNC: 0.6 MG/DL (ref 0.6–1.1)
EOSINOPHILS ABSOLUTE: 0 K/UL (ref 0–0.6)
EOSINOPHILS RELATIVE PERCENT: 0 %
EPITHELIAL CELLS, UA: 4 /HPF (ref 0–5)
FIBRINOGEN: 312 MG/DL (ref 200–397)
GFR AFRICAN AMERICAN: >60
GFR NON-AFRICAN AMERICAN: >60
GLOBULIN: 2.2 G/DL
GLUCOSE BLD-MCNC: 85 MG/DL (ref 70–99)
GLUCOSE URINE: NEGATIVE MG/DL
HCT VFR BLD CALC: 30.4 % (ref 36–48)
HEMATOLOGY PATH CONSULT: NO
HEMOGLOBIN: 9.6 G/DL (ref 12–16)
HYALINE CASTS: 0 /LPF (ref 0–8)
HYPOCHROMIA: ABNORMAL
INR BLD: 1.04 (ref 0.86–1.14)
KETONES, URINE: NEGATIVE MG/DL
LEUKOCYTE ESTERASE, URINE: ABNORMAL
LYMPHOCYTES ABSOLUTE: 1.8 K/UL (ref 1–5.1)
LYMPHOCYTES RELATIVE PERCENT: 19 %
MCH RBC QN AUTO: 19.4 PG (ref 26–34)
MCHC RBC AUTO-ENTMCNC: 31.7 G/DL (ref 31–36)
MCV RBC AUTO: 61.4 FL (ref 80–100)
METAMYELOCYTES RELATIVE PERCENT: 2 %
MICROCYTES: ABNORMAL
MICROSCOPIC EXAMINATION: YES
MONOCYTES ABSOLUTE: 0.2 K/UL (ref 0–1.3)
MONOCYTES RELATIVE PERCENT: 2 %
NEUTROPHILS ABSOLUTE: 7.6 K/UL (ref 1.7–7.7)
NEUTROPHILS RELATIVE PERCENT: 76 %
NITRITE, URINE: NEGATIVE
NUCLEATED RED BLOOD CELLS: 2 /100 WBC
NUCLEATED RED BLOOD CELLS: 2 /100 WBC
OVALOCYTES: ABNORMAL
PDW BLD-RTO: 19.4 % (ref 12.4–15.4)
PH UA: 6 (ref 5–8)
PLATELET # BLD: 357 K/UL (ref 135–450)
PMV BLD AUTO: 9.3 FL (ref 5–10.5)
POIKILOCYTES: ABNORMAL
POTASSIUM SERPL-SCNC: 4.3 MMOL/L (ref 3.5–5.1)
PROTEIN UA: >=300 MG/DL
PROTHROMBIN TIME: 11.8 SEC (ref 9.8–13)
RBC # BLD: 4.95 M/UL (ref 4–5.2)
RBC UA: 8 /HPF (ref 0–4)
SCHISTOCYTES: ABNORMAL
SODIUM BLD-SCNC: 136 MMOL/L (ref 136–145)
SPECIFIC GRAVITY UA: 1.01 (ref 1–1.03)
TARGET CELLS: ABNORMAL
TEAR DROP CELLS: ABNORMAL
TOTAL PROTEIN: 4.9 G/DL (ref 6.4–8.2)
URIC ACID, SERUM: 6.7 MG/DL (ref 2.6–6)
URINE TYPE: ABNORMAL
UROBILINOGEN, URINE: 0.2 E.U./DL
WBC # BLD: 9.7 K/UL (ref 4–11)
WBC UA: 9 /HPF (ref 0–5)

## 2019-09-12 PROCEDURE — 36415 COLL VENOUS BLD VENIPUNCTURE: CPT

## 2019-09-12 PROCEDURE — 6370000000 HC RX 637 (ALT 250 FOR IP): Performed by: OBSTETRICS & GYNECOLOGY

## 2019-09-12 PROCEDURE — 85730 THROMBOPLASTIN TIME PARTIAL: CPT

## 2019-09-12 PROCEDURE — 81001 URINALYSIS AUTO W/SCOPE: CPT

## 2019-09-12 PROCEDURE — 85384 FIBRINOGEN ACTIVITY: CPT

## 2019-09-12 PROCEDURE — 85025 COMPLETE CBC W/AUTO DIFF WBC: CPT

## 2019-09-12 PROCEDURE — 59025 FETAL NON-STRESS TEST: CPT | Performed by: OBSTETRICS & GYNECOLOGY

## 2019-09-12 PROCEDURE — 99233 SBSQ HOSP IP/OBS HIGH 50: CPT | Performed by: OBSTETRICS & GYNECOLOGY

## 2019-09-12 PROCEDURE — 85610 PROTHROMBIN TIME: CPT

## 2019-09-12 PROCEDURE — 6360000002 HC RX W HCPCS: Performed by: OBSTETRICS & GYNECOLOGY

## 2019-09-12 PROCEDURE — 80053 COMPREHEN METABOLIC PANEL: CPT

## 2019-09-12 PROCEDURE — 1220000000 HC SEMI PRIVATE OB R&B

## 2019-09-12 PROCEDURE — 84550 ASSAY OF BLOOD/URIC ACID: CPT

## 2019-09-12 PROCEDURE — 2580000003 HC RX 258: Performed by: OBSTETRICS & GYNECOLOGY

## 2019-09-12 PROCEDURE — 59025 FETAL NON-STRESS TEST: CPT

## 2019-09-12 RX ORDER — NIFEDIPINE 10 MG/1
10 CAPSULE ORAL ONCE
Status: COMPLETED | OUTPATIENT
Start: 2019-09-12 | End: 2019-09-12

## 2019-09-12 RX ORDER — SODIUM CHLORIDE, SODIUM LACTATE, POTASSIUM CHLORIDE, CALCIUM CHLORIDE 600; 310; 30; 20 MG/100ML; MG/100ML; MG/100ML; MG/100ML
INJECTION, SOLUTION INTRAVENOUS CONTINUOUS
Status: DISCONTINUED | OUTPATIENT
Start: 2019-09-12 | End: 2019-09-13 | Stop reason: DRUGHIGH

## 2019-09-12 RX ORDER — NIFEDIPINE 30 MG/1
30 TABLET, EXTENDED RELEASE ORAL 2 TIMES DAILY
Status: DISCONTINUED | OUTPATIENT
Start: 2019-09-12 | End: 2019-09-16 | Stop reason: HOSPADM

## 2019-09-12 RX ADMIN — SODIUM CHLORIDE, POTASSIUM CHLORIDE, SODIUM LACTATE AND CALCIUM CHLORIDE: 600; 310; 30; 20 INJECTION, SOLUTION INTRAVENOUS at 21:22

## 2019-09-12 RX ADMIN — ACETAMINOPHEN 1000 MG: 500 TABLET ORAL at 14:10

## 2019-09-12 RX ADMIN — PRENATAL VIT W/ FE FUMARATE-FA TAB 27-0.8 MG 1 TABLET: 27-0.8 TAB at 18:15

## 2019-09-12 RX ADMIN — NIFEDIPINE 30 MG: 30 TABLET, FILM COATED, EXTENDED RELEASE ORAL at 21:22

## 2019-09-12 RX ADMIN — LABETALOL HYDROCHLORIDE 400 MG: 200 TABLET, FILM COATED ORAL at 19:54

## 2019-09-12 RX ADMIN — DINOPROSTONE 10 MG: 10 INSERT VAGINAL at 17:36

## 2019-09-12 RX ADMIN — ACETAMINOPHEN 1000 MG: 500 TABLET ORAL at 19:53

## 2019-09-12 RX ADMIN — NIFEDIPINE 30 MG: 30 TABLET, FILM COATED, EXTENDED RELEASE ORAL at 09:33

## 2019-09-12 RX ADMIN — NIFEDIPINE 10 MG: 10 CAPSULE ORAL at 04:47

## 2019-09-12 RX ADMIN — LABETALOL HYDROCHLORIDE 400 MG: 200 TABLET, FILM COATED ORAL at 07:39

## 2019-09-12 RX ADMIN — BUTORPHANOL TARTRATE 1 MG: 2 INJECTION, SOLUTION INTRAMUSCULAR; INTRAVENOUS at 21:23

## 2019-09-12 RX ADMIN — ACETAMINOPHEN 1000 MG: 500 TABLET ORAL at 01:08

## 2019-09-12 RX ADMIN — BUTORPHANOL TARTRATE 1 MG: 2 INJECTION, SOLUTION INTRAMUSCULAR; INTRAVENOUS at 21:22

## 2019-09-12 RX ADMIN — SODIUM CHLORIDE, POTASSIUM CHLORIDE, SODIUM LACTATE AND CALCIUM CHLORIDE: 600; 310; 30; 20 INJECTION, SOLUTION INTRAVENOUS at 18:40

## 2019-09-12 RX ADMIN — FERROUS SULFATE TAB EC 324 MG (65 MG FE EQUIVALENT) 324 MG: 324 (65 FE) TABLET DELAYED RESPONSE at 07:39

## 2019-09-12 RX ADMIN — SODIUM CHLORIDE, PRESERVATIVE FREE 10 ML: 5 INJECTION INTRAVENOUS at 18:20

## 2019-09-12 ASSESSMENT — PAIN DESCRIPTION - DESCRIPTORS
DESCRIPTORS: DULL;HEADACHE
DESCRIPTORS: CRAMPING
DESCRIPTORS: CRAMPING
DESCRIPTORS: ACHING
DESCRIPTORS: CRAMPING
DESCRIPTORS: DULL;HEADACHE
DESCRIPTORS: ACHING;SORE
DESCRIPTORS: ACHING
DESCRIPTORS: DULL;HEADACHE
DESCRIPTORS: CRAMPING
DESCRIPTORS: ACHING
DESCRIPTORS: DULL;HEADACHE

## 2019-09-12 ASSESSMENT — PAIN SCALES - GENERAL
PAINLEVEL_OUTOF10: 10
PAINLEVEL_OUTOF10: 8

## 2019-09-13 ENCOUNTER — ANESTHESIA EVENT (OUTPATIENT)
Dept: LABOR AND DELIVERY | Age: 29
DRG: 560 | End: 2019-09-13
Payer: COMMERCIAL

## 2019-09-13 ENCOUNTER — ANESTHESIA (OUTPATIENT)
Dept: LABOR AND DELIVERY | Age: 29
DRG: 560 | End: 2019-09-13
Payer: COMMERCIAL

## 2019-09-13 PROCEDURE — 6360000002 HC RX W HCPCS: Performed by: OBSTETRICS & GYNECOLOGY

## 2019-09-13 PROCEDURE — 2500000003 HC RX 250 WO HCPCS: Performed by: NURSE ANESTHETIST, CERTIFIED REGISTERED

## 2019-09-13 PROCEDURE — 7200000001 HC VAGINAL DELIVERY

## 2019-09-13 PROCEDURE — 6370000000 HC RX 637 (ALT 250 FOR IP): Performed by: OBSTETRICS & GYNECOLOGY

## 2019-09-13 PROCEDURE — 3E033VJ INTRODUCTION OF OTHER HORMONE INTO PERIPHERAL VEIN, PERCUTANEOUS APPROACH: ICD-10-PCS | Performed by: OBSTETRICS & GYNECOLOGY

## 2019-09-13 PROCEDURE — 3E0P7VZ INTRODUCTION OF HORMONE INTO FEMALE REPRODUCTIVE, VIA NATURAL OR ARTIFICIAL OPENING: ICD-10-PCS | Performed by: OBSTETRICS & GYNECOLOGY

## 2019-09-13 PROCEDURE — 1220000000 HC SEMI PRIVATE OB R&B

## 2019-09-13 PROCEDURE — 2580000003 HC RX 258: Performed by: OBSTETRICS & GYNECOLOGY

## 2019-09-13 PROCEDURE — 88307 TISSUE EXAM BY PATHOLOGIST: CPT

## 2019-09-13 PROCEDURE — 6360000002 HC RX W HCPCS: Performed by: ANESTHESIOLOGY

## 2019-09-13 PROCEDURE — 3700000025 EPIDURAL BLOCK: Performed by: ANESTHESIOLOGY

## 2019-09-13 PROCEDURE — 59409 OBSTETRICAL CARE: CPT | Performed by: OBSTETRICS & GYNECOLOGY

## 2019-09-13 PROCEDURE — 51701 INSERT BLADDER CATHETER: CPT

## 2019-09-13 PROCEDURE — 2500000003 HC RX 250 WO HCPCS

## 2019-09-13 RX ORDER — SODIUM CHLORIDE 0.9 % (FLUSH) 0.9 %
10 SYRINGE (ML) INJECTION EVERY 12 HOURS SCHEDULED
Status: DISCONTINUED | OUTPATIENT
Start: 2019-09-13 | End: 2019-09-16 | Stop reason: HOSPADM

## 2019-09-13 RX ORDER — NALBUPHINE HCL 10 MG/ML
5 AMPUL (ML) INJECTION EVERY 4 HOURS PRN
Status: DISCONTINUED | OUTPATIENT
Start: 2019-09-13 | End: 2019-09-16 | Stop reason: HOSPADM

## 2019-09-13 RX ORDER — IBUPROFEN 400 MG/1
800 TABLET ORAL EVERY 8 HOURS
Status: DISCONTINUED | OUTPATIENT
Start: 2019-09-13 | End: 2019-09-16 | Stop reason: HOSPADM

## 2019-09-13 RX ORDER — ONDANSETRON 2 MG/ML
4 INJECTION INTRAMUSCULAR; INTRAVENOUS EVERY 6 HOURS PRN
Status: DISCONTINUED | OUTPATIENT
Start: 2019-09-13 | End: 2019-09-16 | Stop reason: HOSPADM

## 2019-09-13 RX ORDER — HYDROCODONE BITARTRATE AND ACETAMINOPHEN 5; 325 MG/1; MG/1
1 TABLET ORAL EVERY 4 HOURS PRN
Status: DISCONTINUED | OUTPATIENT
Start: 2019-09-13 | End: 2019-09-16 | Stop reason: HOSPADM

## 2019-09-13 RX ORDER — NALOXONE HYDROCHLORIDE 0.4 MG/ML
0.4 INJECTION, SOLUTION INTRAMUSCULAR; INTRAVENOUS; SUBCUTANEOUS PRN
Status: DISCONTINUED | OUTPATIENT
Start: 2019-09-13 | End: 2019-09-16 | Stop reason: HOSPADM

## 2019-09-13 RX ORDER — LIDOCAINE HYDROCHLORIDE 10 MG/ML
INJECTION, SOLUTION INFILTRATION; PERINEURAL PRN
Status: DISCONTINUED | OUTPATIENT
Start: 2019-09-13 | End: 2019-09-13 | Stop reason: SDUPTHER

## 2019-09-13 RX ORDER — PRENATAL VIT/IRON FUM/FOLIC AC 27MG-0.8MG
1 TABLET ORAL NIGHTLY
Status: DISCONTINUED | OUTPATIENT
Start: 2019-09-13 | End: 2019-09-16 | Stop reason: HOSPADM

## 2019-09-13 RX ORDER — SODIUM CHLORIDE, SODIUM LACTATE, POTASSIUM CHLORIDE, CALCIUM CHLORIDE 600; 310; 30; 20 MG/100ML; MG/100ML; MG/100ML; MG/100ML
INJECTION, SOLUTION INTRAVENOUS CONTINUOUS
Status: DISCONTINUED | OUTPATIENT
Start: 2019-09-13 | End: 2019-09-16 | Stop reason: HOSPADM

## 2019-09-13 RX ORDER — MAGNESIUM SULFATE IN WATER 40 MG/ML
4 INJECTION, SOLUTION INTRAVENOUS ONCE
Status: COMPLETED | OUTPATIENT
Start: 2019-09-13 | End: 2019-09-13

## 2019-09-13 RX ORDER — METHYLERGONOVINE MALEATE 0.2 MG/ML
200 INJECTION INTRAVENOUS
Status: ACTIVE | OUTPATIENT
Start: 2019-09-13 | End: 2019-09-13

## 2019-09-13 RX ORDER — LIDOCAINE HYDROCHLORIDE AND EPINEPHRINE 15; 5 MG/ML; UG/ML
INJECTION, SOLUTION EPIDURAL PRN
Status: DISCONTINUED | OUTPATIENT
Start: 2019-09-13 | End: 2019-09-13 | Stop reason: SDUPTHER

## 2019-09-13 RX ORDER — SODIUM CHLORIDE 0.9 % (FLUSH) 0.9 %
10 SYRINGE (ML) INJECTION PRN
Status: DISCONTINUED | OUTPATIENT
Start: 2019-09-13 | End: 2019-09-16 | Stop reason: HOSPADM

## 2019-09-13 RX ORDER — MAGNESIUM SULFATE 4 G/50ML
4 INJECTION INTRAVENOUS ONCE
Status: DISCONTINUED | OUTPATIENT
Start: 2019-09-13 | End: 2019-09-13 | Stop reason: SDUPTHER

## 2019-09-13 RX ORDER — DOCUSATE SODIUM 100 MG/1
100 CAPSULE, LIQUID FILLED ORAL 2 TIMES DAILY
Status: DISCONTINUED | OUTPATIENT
Start: 2019-09-13 | End: 2019-09-16 | Stop reason: HOSPADM

## 2019-09-13 RX ORDER — HYDROCODONE BITARTRATE AND ACETAMINOPHEN 5; 325 MG/1; MG/1
2 TABLET ORAL EVERY 4 HOURS PRN
Status: DISCONTINUED | OUTPATIENT
Start: 2019-09-13 | End: 2019-09-16 | Stop reason: HOSPADM

## 2019-09-13 RX ORDER — ACETAMINOPHEN 325 MG/1
650 TABLET ORAL EVERY 4 HOURS PRN
Status: DISCONTINUED | OUTPATIENT
Start: 2019-09-13 | End: 2019-09-16 | Stop reason: HOSPADM

## 2019-09-13 RX ORDER — BUPIVACAINE HYDROCHLORIDE 2.5 MG/ML
INJECTION, SOLUTION EPIDURAL; INFILTRATION; INTRACAUDAL PRN
Status: DISCONTINUED | OUTPATIENT
Start: 2019-09-13 | End: 2019-09-13 | Stop reason: SDUPTHER

## 2019-09-13 RX ORDER — MAGNESIUM SULFATE 4 G/50ML
4 INJECTION INTRAVENOUS ONCE
Status: DISCONTINUED | OUTPATIENT
Start: 2019-09-13 | End: 2019-09-13

## 2019-09-13 RX ORDER — LANOLIN 100 %
OINTMENT (GRAM) TOPICAL PRN
Status: DISCONTINUED | OUTPATIENT
Start: 2019-09-13 | End: 2019-09-16 | Stop reason: HOSPADM

## 2019-09-13 RX ADMIN — MAGNESIUM SULFATE HEPTAHYDRATE 4 G: 40 INJECTION, SOLUTION INTRAVENOUS at 09:00

## 2019-09-13 RX ADMIN — MAGNESIUM SULFATE IN WATER 2 G/HR: 40 INJECTION, SOLUTION INTRAVENOUS at 12:13

## 2019-09-13 RX ADMIN — IBUPROFEN 800 MG: 400 TABLET, FILM COATED ORAL at 14:56

## 2019-09-13 RX ADMIN — Medication 999 ML/HR: at 03:50

## 2019-09-13 RX ADMIN — NIFEDIPINE 30 MG: 30 TABLET, FILM COATED, EXTENDED RELEASE ORAL at 21:31

## 2019-09-13 RX ADMIN — BENZOCAINE AND LEVOMENTHOL: 200; 5 SPRAY TOPICAL at 06:27

## 2019-09-13 RX ADMIN — DEXTROSE MONOHYDRATE 5 MILLION UNITS: 5 INJECTION INTRAVENOUS at 02:23

## 2019-09-13 RX ADMIN — DOCUSATE SODIUM 100 MG: 100 CAPSULE, LIQUID FILLED ORAL at 09:42

## 2019-09-13 RX ADMIN — DOCUSATE SODIUM 100 MG: 100 CAPSULE, LIQUID FILLED ORAL at 23:27

## 2019-09-13 RX ADMIN — NITROFURANTOIN MONOHYDRATE/MACROCRYSTALLINE 100 MG: 25; 75 CAPSULE ORAL at 02:10

## 2019-09-13 RX ADMIN — FERROUS SULFATE TAB EC 324 MG (65 MG FE EQUIVALENT) 324 MG: 324 (65 FE) TABLET DELAYED RESPONSE at 09:42

## 2019-09-13 RX ADMIN — IBUPROFEN 800 MG: 400 TABLET, FILM COATED ORAL at 06:24

## 2019-09-13 RX ADMIN — SODIUM CHLORIDE, POTASSIUM CHLORIDE, SODIUM LACTATE AND CALCIUM CHLORIDE: 600; 310; 30; 20 INJECTION, SOLUTION INTRAVENOUS at 08:50

## 2019-09-13 RX ADMIN — IBUPROFEN 800 MG: 400 TABLET, FILM COATED ORAL at 23:27

## 2019-09-13 RX ADMIN — MAGNESIUM SULFATE IN WATER 2 G/HR: 40 INJECTION, SOLUTION INTRAVENOUS at 22:35

## 2019-09-13 RX ADMIN — NITROFURANTOIN MONOHYDRATE/MACROCRYSTALLINE 100 MG: 25; 75 CAPSULE ORAL at 12:32

## 2019-09-13 RX ADMIN — SODIUM CHLORIDE, POTASSIUM CHLORIDE, SODIUM LACTATE AND CALCIUM CHLORIDE: 600; 310; 30; 20 INJECTION, SOLUTION INTRAVENOUS at 02:04

## 2019-09-13 RX ADMIN — LIDOCAINE HYDROCHLORIDE 3 ML: 10 INJECTION, SOLUTION INFILTRATION; PERINEURAL at 01:42

## 2019-09-13 RX ADMIN — SODIUM CHLORIDE, POTASSIUM CHLORIDE, SODIUM LACTATE AND CALCIUM CHLORIDE: 600; 310; 30; 20 INJECTION, SOLUTION INTRAVENOUS at 12:05

## 2019-09-13 RX ADMIN — SODIUM CHLORIDE, POTASSIUM CHLORIDE, SODIUM LACTATE AND CALCIUM CHLORIDE: 600; 310; 30; 20 INJECTION, SOLUTION INTRAVENOUS at 17:28

## 2019-09-13 RX ADMIN — NITROFURANTOIN MONOHYDRATE/MACROCRYSTALLINE 100 MG: 25; 75 CAPSULE ORAL at 21:31

## 2019-09-13 RX ADMIN — LIDOCAINE HYDROCHLORIDE AND EPINEPHRINE 3 ML: 15; 5 INJECTION, SOLUTION EPIDURAL at 01:52

## 2019-09-13 RX ADMIN — ONDANSETRON 4 MG: 2 INJECTION INTRAMUSCULAR; INTRAVENOUS at 08:55

## 2019-09-13 RX ADMIN — BUPIVACAINE HYDROCHLORIDE 10 ML: 2.5 INJECTION, SOLUTION EPIDURAL; INFILTRATION; INTRACAUDAL at 01:55

## 2019-09-13 RX ADMIN — PRENATAL VIT W/ FE FUMARATE-FA TAB 27-0.8 MG 1 TABLET: 27-0.8 TAB at 21:31

## 2019-09-13 RX ADMIN — NIFEDIPINE 30 MG: 30 TABLET, FILM COATED, EXTENDED RELEASE ORAL at 09:42

## 2019-09-13 ASSESSMENT — PAIN DESCRIPTION - DESCRIPTORS
DESCRIPTORS: ACHING
DESCRIPTORS: ACHING
DESCRIPTORS: CRAMPING
DESCRIPTORS: SORE
DESCRIPTORS: ACHING
DESCRIPTORS: CRAMPING
DESCRIPTORS: PRESSURE
DESCRIPTORS: CRAMPING
DESCRIPTORS: CRAMPING
DESCRIPTORS: ACHING
DESCRIPTORS: ACHING

## 2019-09-13 ASSESSMENT — PAIN DESCRIPTION - PROGRESSION: CLINICAL_PROGRESSION: GRADUALLY WORSENING

## 2019-09-13 ASSESSMENT — PAIN SCALES - GENERAL
PAINLEVEL_OUTOF10: 0
PAINLEVEL_OUTOF10: 8
PAINLEVEL_OUTOF10: 9
PAINLEVEL_OUTOF10: 0

## 2019-09-13 NOTE — FLOWSHEET NOTE
Bedside handoff completed. All questions answered. Orders reviewed. Patient included in discussion regarding plan of care. Pt reports mild HA. Report received from [address] [place] [address].

## 2019-09-13 NOTE — FLOWSHEET NOTE
Phone call received from Dr. Mark Anthony Carpenter after reviewing [address] tracing. RN to start low dose Pitocin at 0030 if inadequate labor pattern. If pt uncomfortable and mehreen adequate, RN may hold Pitocin.

## 2019-09-13 NOTE — FLOWSHEET NOTE
Pt c/o perineal pressure. SVE performed. Dr. Dodie Tam called for update. MD en route. Dr. Sydney Cutler called with update and MD to evaluate pt at bedside. Pt repositioned on right side with legs closed. Breathing well with ctxs. RN remains at bedside. Room previously prepared for delivery.

## 2019-09-13 NOTE — FLOWSHEET NOTE
Bedside handoff completed. All questions answered. Orders reviewed. Patient included in discussion regarding plan of care. Report given DWAYNE PIÑA.

## 2019-09-13 NOTE — PROGRESS NOTES
Nutrition Assessment (Low Risk)    Type and Reason for Visit: Initial(los)    Nutrition Recommendations:   No recommendations at this time     Nutrition Assessment:  Patient assessed for nutritional risk. Pt was gestational pta. Blood sugars have returned to normal postpartum. Deemed to be at low risk at this time. Will continue to monitor for changes in status.       Malnutrition Assessment:  · Malnutrition Status: No malnutrition    Nutrition Risk Level   Risk Level: Low    Nutrition Diagnosis:   · Problem: No nutrition diagnosis at this time    Nutrition Intervention:  Food and/or Delivery: Continue current diet  Nutrition Education/Counseling/Coordination of Care:  Continued Inpatient Monitoring, Education Not Indicated      Electronically signed by Brigette Horowitz RD, LD on 9/13/19 at 9:53 AM    Contact Number: [phone]

## 2019-09-13 NOTE — FLOWSHEET NOTE
Pt ambulated to BR with assist, void without difficulty. Pt instructed on perineal care and self administration of perineal meds. Pt verbalized understanding and may self medicate.

## 2019-09-13 NOTE — FLOWSHEET NOTE
RN at bedside to reposition pt. Pt sleeping when RN enters but verbalizes \"I am not pain free\". Pt falling in and out of sleep while speaking to RN. Britt Tristan CRNA notified and orders received to sit pt up and continue monitoring d/t recent cervical change. Pericare performed and pt assisted to high fowlers right tilt.

## 2019-09-13 NOTE — ANESTHESIA PROCEDURE NOTES
Epidural Block    Patient location during procedure: OB  Start time: 9/13/2019 1:40 AM  End time: 9/13/2019 1:56 AM  Reason for block: labor epidural  Staffing  Resident/CRNA: Shanta Rodriguez [facility], APRN - CRNA  Preanesthetic Checklist  Completed: patient identified, site marked, surgical consent, pre-op evaluation, timeout performed, IV checked, risks and benefits discussed, monitors and equipment checked, anesthesia consent given, oxygen available and patient being monitored  Epidural  Patient position: sitting  Prep: Betadine and site prepped and draped  Patient monitoring: cardiac monitor, continuous pulse ox and frequent blood pressure checks  Approach: midline  Location: lumbar (1-5)  Injection technique: TRUMAN air  Provider prep: mask and sterile gloves  Needle  Needle type: Tuohy   Needle gauge: 17 G  Needle length: 3.5 in  Needle insertion depth: 6 cm  Catheter type: side hole  Catheter size: 19 G  Catheter at skin depth: 12 cm  Test dose: negative  Assessment  Sensory level: T8  Hemodynamics: stable  Attempts: 1

## 2019-09-13 NOTE — ANESTHESIA PRE PROCEDURE
Department of Anesthesiology  Preprocedure Note       Name:  Ozzy Garcia   Age:  30 y.o.  :  1990                                          MRN:  [id number]         Date:  2019      Surgeon: * No surgeons listed *    Procedure: Labor Analgesia    Medications prior to admission:   Prior to Admission medications    Medication Sig Start Date End Date Taking? Authorizing Provider   ferrous sulfate 325 (65 Fe) MG tablet Take 325 mg by mouth 2 times daily (with meals) Drink a small glass of OJ with iron for better absorption.    Yes Historical Provider, MD   Prenatal Vit-Fe Fumarate-FA (PRENATAL VITAMIN PO) Take 1 tablet by mouth daily   Yes Historical Provider, MD       Current medications:    Current Facility-Administered Medications   Medication Dose Route Frequency Provider Last Rate Last Dose    fentaNYL 3 mcg/ml bupivacaine 0.125% in sodium chloride 0.9% 100 mL epidural             penicillin G potassium 5 Million Units in dextrose 5 % 100 mL IVPB (mini-bag)  5 Million Units Intravenous Once Lesvia White  mL/hr at 19 0223 5 Million Units at 19 0223    penicillin G potassium in d5w IVPB 2.5 Million Units  2.5 Million Units Intravenous Q4H Lesvia White MD        NIFEdipine (PROCARDIA XL) extended release tablet 30 mg  30 mg Oral BID Ash Sanchez MD   30 mg at 19    lactated ringers infusion   Intravenous Continuous Lesvia White  mL/hr at 19 0204      oxytocin (PITOCIN) 30 units in 500 mL infusion  1 sandi-units/min Intravenous Continuous Sandrine Vance MD        nitrofurantoin (macrocrystal-monohydrate) (MACROBID) capsule 100 mg  100 mg Oral 2 times per day Ash Sanchez MD   100 mg at 19 0210    labetalol (NORMODYNE) tablet 400 mg  400 mg Oral 2 times per day Ash Sanchez MD   400 mg at 19    ondansetron (ZOFRAN) injection 8 mg  8 mg Intravenous Q6H PRN Raiza Quiles MD        ferrous sulfate EC tablet 324 mg  324 mg Oral Daily with breakfast Lesvia White MD   324 mg at 19 9804    prenatal vitamin tablet 1 tablet  1 tablet Oral Daily Lesvia White MD   1 tablet at 19 1815    labetalol (NORMODYNE;TRANDATE) injection 10 mg  10 mg Intravenous Q10 Min PRN Lesvia White MD   10 mg at 19 1254    acetaminophen (TYLENOL) tablet 1,000 mg  1,000 mg Oral Q6H PRN Brionna Schumacher MD   1,000 mg at 19 1953    sodium chloride flush 0.9 % injection 10 mL  10 mL Intravenous BID Brionna Schumacher MD   10 mL at 19 1820    zolpidem (AMBIEN) tablet 10 mg  10 mg Oral Once Brionna Schumacher MD           Allergies:  No Known Allergies    Problem List:    Patient Active Problem List   Diagnosis Code    Anemia affecting pregnancy in second trimester O99.012    33 y.o. [id number] Z34.90    History of pre-eclampsia Z87.59    GBS (group B Streptococcus carrier), +RV culture, currently pregnant O99.820    GBS bacteriuria R82.71    Bacterial vaginosis N76.0, B96.89     contractions O47.9    Severe pre-eclampsia in third trimester O14.13       Past Medical History:        Diagnosis Date    Anemia     with last pregnancy    Miscarriage     Miscarriage     D and C     Preeclampsia, third trimester 2018       Past Surgical History:        Procedure Laterality Date    DILATION AND CURETTAGE OF UTERUS      MAB    JOINT REPLACEMENT         Social History:    Social History     Tobacco Use    Smoking status: Never Smoker    Smokeless tobacco: Never Used   Substance Use Topics    Alcohol use:  No                                Counseling given: Not Answered      Vital Signs (Current):   Vitals:    19 0204 19 0209 19 0212 19 0214   BP:  (!) 136/94  (!) 137/93   Pulse:  72  74   Resp:  16     Temp:       TempSrc:       SpO2: 94%  96%    Weight:       Height:                                                  BP Readings from Last 3

## 2019-09-13 NOTE — FLOWSHEET NOTE
SCN RNs SUZI Rojas and Eden Ratliff present. Dr. Marva Mendez at bedside for delivery. Pt c/o unintentional baring down with ctxs. Decision made to proceed with delivery per Dr. Marva Mendez d/t involuntary pushing. Pt legs placed in stirrups, perineal prep completed and pt draped for delivery by MD. SROM noted after delivery of amniotic sac. Delivery of viable male via  after one push with good maternal effort. Infant dried and stimulated with mouth and nose bulb suctioned on mother's abdomen. Spontaneous, strong cry noted from infant.   Cord clamped and cut with infant taken immediately to radiant warmer for evaluation by SCN team.

## 2019-09-14 LAB
A/G RATIO: 1 (ref 1.1–2.2)
ALBUMIN SERPL-MCNC: 2.9 G/DL (ref 3.4–5)
ALP BLD-CCNC: 86 U/L (ref 40–129)
ALT SERPL-CCNC: 24 U/L (ref 10–40)
ANION GAP SERPL CALCULATED.3IONS-SCNC: 13 MMOL/L (ref 3–16)
APTT: 28.7 SEC (ref 26–36)
AST SERPL-CCNC: 24 U/L (ref 15–37)
BASOPHILS ABSOLUTE: 0.1 K/UL (ref 0–0.2)
BASOPHILS RELATIVE PERCENT: 1 %
BILIRUB SERPL-MCNC: <0.2 MG/DL (ref 0–1)
BUN BLDV-MCNC: 18 MG/DL (ref 7–20)
CALCIUM SERPL-MCNC: 8 MG/DL (ref 8.3–10.6)
CHLORIDE BLD-SCNC: 99 MMOL/L (ref 99–110)
CO2: 24 MMOL/L (ref 21–32)
CREAT SERPL-MCNC: 0.7 MG/DL (ref 0.6–1.1)
EOSINOPHILS ABSOLUTE: 0.2 K/UL (ref 0–0.6)
EOSINOPHILS RELATIVE PERCENT: 2 %
FIBRINOGEN: 464 MG/DL (ref 200–397)
GFR AFRICAN AMERICAN: >60
GFR NON-AFRICAN AMERICAN: >60
GLOBULIN: 3 G/DL
GLUCOSE BLD-MCNC: 100 MG/DL (ref 70–99)
HCT VFR BLD CALC: 32.1 % (ref 36–48)
HEMATOLOGY PATH CONSULT: NO
HEMOGLOBIN: 10.1 G/DL (ref 12–16)
HYPOCHROMIA: ABNORMAL
INR BLD: 0.89 (ref 0.86–1.14)
LYMPHOCYTES ABSOLUTE: 0.8 K/UL (ref 1–5.1)
LYMPHOCYTES RELATIVE PERCENT: 8 %
MCH RBC QN AUTO: 19.4 PG (ref 26–34)
MCHC RBC AUTO-ENTMCNC: 31.4 G/DL (ref 31–36)
MCV RBC AUTO: 61.8 FL (ref 80–100)
MICROCYTES: ABNORMAL
MONOCYTES ABSOLUTE: 1 K/UL (ref 0–1.3)
MONOCYTES RELATIVE PERCENT: 9 %
NEUTROPHILS ABSOLUTE: 8.5 K/UL (ref 1.7–7.7)
NEUTROPHILS RELATIVE PERCENT: 80 %
OVALOCYTES: ABNORMAL
PDW BLD-RTO: 20.4 % (ref 12.4–15.4)
PLATELET # BLD: 380 K/UL (ref 135–450)
PMV BLD AUTO: 8.9 FL (ref 5–10.5)
POTASSIUM SERPL-SCNC: 4.3 MMOL/L (ref 3.5–5.1)
PROTHROMBIN TIME: 10.2 SEC (ref 9.8–13)
RBC # BLD: 5.19 M/UL (ref 4–5.2)
SCHISTOCYTES: ABNORMAL
SODIUM BLD-SCNC: 136 MMOL/L (ref 136–145)
TARGET CELLS: ABNORMAL
TEAR DROP CELLS: ABNORMAL
TOTAL PROTEIN: 5.9 G/DL (ref 6.4–8.2)
URIC ACID, SERUM: 6.7 MG/DL (ref 2.6–6)
WBC # BLD: 10.6 K/UL (ref 4–11)

## 2019-09-14 PROCEDURE — 6370000000 HC RX 637 (ALT 250 FOR IP): Performed by: OBSTETRICS & GYNECOLOGY

## 2019-09-14 PROCEDURE — 6360000002 HC RX W HCPCS

## 2019-09-14 PROCEDURE — 85730 THROMBOPLASTIN TIME PARTIAL: CPT

## 2019-09-14 PROCEDURE — 80053 COMPREHEN METABOLIC PANEL: CPT

## 2019-09-14 PROCEDURE — 1220000000 HC SEMI PRIVATE OB R&B

## 2019-09-14 PROCEDURE — 2580000003 HC RX 258: Performed by: OBSTETRICS & GYNECOLOGY

## 2019-09-14 PROCEDURE — 85025 COMPLETE CBC W/AUTO DIFF WBC: CPT

## 2019-09-14 PROCEDURE — 84550 ASSAY OF BLOOD/URIC ACID: CPT

## 2019-09-14 PROCEDURE — 85384 FIBRINOGEN ACTIVITY: CPT

## 2019-09-14 PROCEDURE — 85610 PROTHROMBIN TIME: CPT

## 2019-09-14 RX ADMIN — NITROFURANTOIN MONOHYDRATE/MACROCRYSTALLINE 100 MG: 25; 75 CAPSULE ORAL at 20:46

## 2019-09-14 RX ADMIN — SODIUM CHLORIDE, PRESERVATIVE FREE 10 ML: 5 INJECTION INTRAVENOUS at 05:35

## 2019-09-14 RX ADMIN — MAGNESIUM SULFATE IN WATER 20 G: 40 INJECTION, SOLUTION INTRAVENOUS at 09:30

## 2019-09-14 RX ADMIN — SODIUM CHLORIDE, PRESERVATIVE FREE 10 ML: 5 INJECTION INTRAVENOUS at 09:50

## 2019-09-14 RX ADMIN — PRENATAL VIT W/ FE FUMARATE-FA TAB 27-0.8 MG 1 TABLET: 27-0.8 TAB at 20:46

## 2019-09-14 RX ADMIN — NIFEDIPINE 30 MG: 30 TABLET, FILM COATED, EXTENDED RELEASE ORAL at 09:49

## 2019-09-14 RX ADMIN — NIFEDIPINE 30 MG: 30 TABLET, FILM COATED, EXTENDED RELEASE ORAL at 20:46

## 2019-09-14 RX ADMIN — IBUPROFEN 800 MG: 400 TABLET, FILM COATED ORAL at 16:52

## 2019-09-14 RX ADMIN — DOCUSATE SODIUM 100 MG: 100 CAPSULE, LIQUID FILLED ORAL at 20:46

## 2019-09-14 RX ADMIN — DOCUSATE SODIUM 100 MG: 100 CAPSULE, LIQUID FILLED ORAL at 09:49

## 2019-09-14 RX ADMIN — SODIUM CHLORIDE, POTASSIUM CHLORIDE, SODIUM LACTATE AND CALCIUM CHLORIDE: 600; 310; 30; 20 INJECTION, SOLUTION INTRAVENOUS at 05:32

## 2019-09-14 RX ADMIN — FERROUS SULFATE TAB EC 324 MG (65 MG FE EQUIVALENT) 324 MG: 324 (65 FE) TABLET DELAYED RESPONSE at 07:42

## 2019-09-14 RX ADMIN — NITROFURANTOIN MONOHYDRATE/MACROCRYSTALLINE 100 MG: 25; 75 CAPSULE ORAL at 09:49

## 2019-09-14 RX ADMIN — IBUPROFEN 800 MG: 400 TABLET, FILM COATED ORAL at 07:42

## 2019-09-14 ASSESSMENT — PAIN DESCRIPTION - DESCRIPTORS: DESCRIPTORS: ACHING

## 2019-09-14 ASSESSMENT — PAIN DESCRIPTION - RADICULAR PAIN: RADICULAR_PAIN: ABSENT

## 2019-09-14 ASSESSMENT — PAIN SCALES - GENERAL
PAINLEVEL_OUTOF10: 8
PAINLEVEL_OUTOF10: 0

## 2019-09-14 NOTE — FLOWSHEET NOTE
Patient's IV had an infiltration yesterday after mag was started and mag did not get restarted until 1215. Orders were given to continue the magnesium until the full 24 hours from the restart at 1215. Nurse will discontinue magnesium at that time.

## 2019-09-15 PROCEDURE — 1220000000 HC SEMI PRIVATE OB R&B

## 2019-09-15 PROCEDURE — 6370000000 HC RX 637 (ALT 250 FOR IP): Performed by: OBSTETRICS & GYNECOLOGY

## 2019-09-15 PROCEDURE — 2580000003 HC RX 258: Performed by: OBSTETRICS & GYNECOLOGY

## 2019-09-15 PROCEDURE — 99222 1ST HOSP IP/OBS MODERATE 55: CPT | Performed by: OBSTETRICS & GYNECOLOGY

## 2019-09-15 RX ORDER — NIFEDIPINE 30 MG/1
30 TABLET, FILM COATED, EXTENDED RELEASE ORAL DAILY
Qty: 30 TABLET | Refills: 0 | Status: SHIPPED | OUTPATIENT
Start: 2019-09-15 | End: 2021-08-25 | Stop reason: ALTCHOICE

## 2019-09-15 RX ADMIN — IBUPROFEN 800 MG: 400 TABLET, FILM COATED ORAL at 00:11

## 2019-09-15 RX ADMIN — FERROUS SULFATE TAB EC 324 MG (65 MG FE EQUIVALENT) 324 MG: 324 (65 FE) TABLET DELAYED RESPONSE at 09:10

## 2019-09-15 RX ADMIN — PRENATAL VIT W/ FE FUMARATE-FA TAB 27-0.8 MG 1 TABLET: 27-0.8 TAB at 00:11

## 2019-09-15 RX ADMIN — SODIUM CHLORIDE, PRESERVATIVE FREE 10 ML: 5 INJECTION INTRAVENOUS at 09:10

## 2019-09-15 RX ADMIN — NITROFURANTOIN MONOHYDRATE/MACROCRYSTALLINE 100 MG: 25; 75 CAPSULE ORAL at 00:11

## 2019-09-15 RX ADMIN — IBUPROFEN 800 MG: 400 TABLET, FILM COATED ORAL at 00:45

## 2019-09-15 RX ADMIN — DOCUSATE SODIUM 100 MG: 100 CAPSULE, LIQUID FILLED ORAL at 09:10

## 2019-09-15 RX ADMIN — SODIUM CHLORIDE, PRESERVATIVE FREE 10 ML: 5 INJECTION INTRAVENOUS at 00:45

## 2019-09-15 RX ADMIN — NIFEDIPINE 30 MG: 30 TABLET, FILM COATED, EXTENDED RELEASE ORAL at 09:10

## 2019-09-15 RX ADMIN — NITROFURANTOIN MONOHYDRATE/MACROCRYSTALLINE 100 MG: 25; 75 CAPSULE ORAL at 09:10

## 2019-09-15 RX ADMIN — IBUPROFEN 800 MG: 400 TABLET, FILM COATED ORAL at 09:10

## 2019-09-15 ASSESSMENT — PAIN SCALES - GENERAL
PAINLEVEL_OUTOF10: 0
PAINLEVEL_OUTOF10: 0
PAINLEVEL_OUTOF10: 4
PAINLEVEL_OUTOF10: 5

## 2019-09-15 NOTE — FLOWSHEET NOTE
Morning assessment completed at bedside. VSS. Fundus firm, with minimal bleeding. Pt eating, drinking and urinating well on own. Pain controlled at this time with PRN pain meds. Medication side effects discussed, mom without questions at this time. Pt denies headache, vision disturbances, epigastric pain, nausea/vomiting. Discussed plan of care with pt. Sponge bath supplies given. Call light within reach. No needs at this time. Will monitor. Encouraged pt to call with any needs.

## 2019-09-16 VITALS
TEMPERATURE: 98.8 F | DIASTOLIC BLOOD PRESSURE: 91 MMHG | OXYGEN SATURATION: 99 % | WEIGHT: 177.47 LBS | HEIGHT: 63 IN | SYSTOLIC BLOOD PRESSURE: 131 MMHG | HEART RATE: 99 BPM | RESPIRATION RATE: 16 BRPM | BODY MASS INDEX: 31.45 KG/M2

## 2019-09-16 PROCEDURE — 6370000000 HC RX 637 (ALT 250 FOR IP): Performed by: OBSTETRICS & GYNECOLOGY

## 2019-09-16 RX ADMIN — NIFEDIPINE 30 MG: 30 TABLET, FILM COATED, EXTENDED RELEASE ORAL at 08:36

## 2019-09-16 RX ADMIN — IBUPROFEN 800 MG: 400 TABLET, FILM COATED ORAL at 08:36

## 2019-09-16 RX ADMIN — DOCUSATE SODIUM 100 MG: 100 CAPSULE, LIQUID FILLED ORAL at 08:36

## 2019-09-16 RX ADMIN — FERROUS SULFATE TAB EC 324 MG (65 MG FE EQUIVALENT) 324 MG: 324 (65 FE) TABLET DELAYED RESPONSE at 08:36

## 2019-09-16 RX ADMIN — DOCUSATE SODIUM 100 MG: 100 CAPSULE, LIQUID FILLED ORAL at 00:11

## 2019-09-16 RX ADMIN — NIFEDIPINE 30 MG: 30 TABLET, FILM COATED, EXTENDED RELEASE ORAL at 00:11

## 2019-09-16 ASSESSMENT — PAIN SCALES - GENERAL: PAINLEVEL_OUTOF10: 5

## 2019-09-16 ASSESSMENT — PAIN DESCRIPTION - DESCRIPTORS
DESCRIPTORS: CRAMPING
DESCRIPTORS: CRAMPING

## 2019-09-16 NOTE — FLOWSHEET NOTE
Patient resting in bed. Assessment completed. Ice water pitcher filled. Patient denies needs at this time. Will continue to monitor.

## 2019-09-16 NOTE — FLOWSHEET NOTE
Postpartum care teaching completed and form signed by patient. Copy witnessed by RN and given to patient. Patient verbalized understanding of all teaching points. Prescriptions already given per patient. Patient plans to follow-up with [facility] Provider as instructed. Patient discharged in stable condition accompanied and will be rooming in 2265. Infant is still admitted to [facility].

## 2019-10-25 ENCOUNTER — HOSPITAL ENCOUNTER (OUTPATIENT)
Dept: OBGYN CLINIC | Age: 29
Discharge: HOME | End: 2019-10-25
Payer: COMMERCIAL

## 2019-10-25 VITALS
BODY MASS INDEX: 25.76 KG/M2 | DIASTOLIC BLOOD PRESSURE: 90 MMHG | SYSTOLIC BLOOD PRESSURE: 133 MMHG | WEIGHT: 145.4 LBS | HEART RATE: 86 BPM

## 2019-10-25 DIAGNOSIS — Z34.90 PREGNANCY, UNSPECIFIED GESTATIONAL AGE: ICD-10-CM

## 2019-10-25 PROCEDURE — 99212 OFFICE O/P EST SF 10 MIN: CPT

## 2019-10-25 RX ORDER — ACETAMINOPHEN AND CODEINE PHOSPHATE 120; 12 MG/5ML; MG/5ML
1 SOLUTION ORAL DAILY
Qty: 90 TABLET | Refills: 1 | Status: SHIPPED | OUTPATIENT
Start: 2019-10-25 | End: 2021-08-25 | Stop reason: ALTCHOICE

## 2019-11-19 ENCOUNTER — OFFICE VISIT (OUTPATIENT)
Dept: GYNECOLOGY | Age: 29
End: 2019-11-19
Payer: COMMERCIAL

## 2019-11-19 VITALS — DIASTOLIC BLOOD PRESSURE: 82 MMHG | BODY MASS INDEX: 25.33 KG/M2 | SYSTOLIC BLOOD PRESSURE: 118 MMHG | WEIGHT: 143 LBS

## 2019-11-19 DIAGNOSIS — Z30.011 ENCOUNTER FOR INITIAL PRESCRIPTION OF CONTRACEPTIVE PILLS: ICD-10-CM

## 2019-11-19 DIAGNOSIS — Z01.419 WELL WOMAN EXAM WITH ROUTINE GYNECOLOGICAL EXAM: Primary | ICD-10-CM

## 2019-11-19 LAB
CONTROL: NORMAL
PREGNANCY TEST URINE, POC: NORMAL

## 2019-11-19 PROCEDURE — G8484 FLU IMMUNIZE NO ADMIN: HCPCS | Performed by: OBSTETRICS & GYNECOLOGY

## 2019-11-19 PROCEDURE — 99385 PREV VISIT NEW AGE 18-39: CPT | Performed by: OBSTETRICS & GYNECOLOGY

## 2019-11-19 PROCEDURE — 81025 URINE PREGNANCY TEST: CPT | Performed by: OBSTETRICS & GYNECOLOGY

## 2019-11-19 RX ORDER — ACETAMINOPHEN AND CODEINE PHOSPHATE 120; 12 MG/5ML; MG/5ML
1 SOLUTION ORAL DAILY
Qty: 28 TABLET | Refills: 11 | Status: SHIPPED | OUTPATIENT
Start: 2019-11-19 | End: 2021-08-25 | Stop reason: ALTCHOICE

## 2019-11-22 LAB
HPV COMMENT: NORMAL
HPV TYPE 16: NOT DETECTED
HPV TYPE 18: NOT DETECTED
HPVOH (OTHER TYPES): NOT DETECTED

## 2019-12-04 ENCOUNTER — OFFICE VISIT (OUTPATIENT)
Dept: GYNECOLOGY | Age: 29
End: 2019-12-04
Payer: COMMERCIAL

## 2019-12-04 VITALS
DIASTOLIC BLOOD PRESSURE: 90 MMHG | TEMPERATURE: 98.6 F | HEART RATE: 102 BPM | WEIGHT: 139 LBS | SYSTOLIC BLOOD PRESSURE: 128 MMHG | BODY MASS INDEX: 24.63 KG/M2 | HEIGHT: 63 IN

## 2019-12-04 DIAGNOSIS — R35.0 FREQUENT URINATION: Primary | ICD-10-CM

## 2019-12-04 DIAGNOSIS — N89.8 VAGINAL DISCHARGE: ICD-10-CM

## 2019-12-04 DIAGNOSIS — R30.9 PAINFUL URINATION: ICD-10-CM

## 2019-12-04 LAB
BILIRUBIN, POC: ABNORMAL
BLOOD URINE, POC: ABNORMAL
CLARITY, POC: ABNORMAL
COLOR, POC: YELLOW
GLUCOSE URINE, POC: ABNORMAL
KETONES, POC: ABNORMAL
LEUKOCYTE EST, POC: ABNORMAL
NITRITE, POC: ABNORMAL
PH, POC: 6
PROTEIN, POC: ABNORMAL
SPECIFIC GRAVITY, POC: >=1.03
UROBILINOGEN, POC: ABNORMAL

## 2019-12-04 PROCEDURE — G8484 FLU IMMUNIZE NO ADMIN: HCPCS | Performed by: OBSTETRICS & GYNECOLOGY

## 2019-12-04 PROCEDURE — 99213 OFFICE O/P EST LOW 20 MIN: CPT | Performed by: OBSTETRICS & GYNECOLOGY

## 2019-12-04 PROCEDURE — 81002 URINALYSIS NONAUTO W/O SCOPE: CPT | Performed by: OBSTETRICS & GYNECOLOGY

## 2019-12-04 PROCEDURE — G8420 CALC BMI NORM PARAMETERS: HCPCS | Performed by: OBSTETRICS & GYNECOLOGY

## 2019-12-04 PROCEDURE — 1036F TOBACCO NON-USER: CPT | Performed by: OBSTETRICS & GYNECOLOGY

## 2019-12-04 PROCEDURE — G8427 DOCREV CUR MEDS BY ELIG CLIN: HCPCS | Performed by: OBSTETRICS & GYNECOLOGY

## 2019-12-05 LAB
CANDIDA SPECIES, DNA PROBE: NORMAL
GARDNERELLA VAGINALIS, DNA PROBE: NORMAL
TRICHOMONAS VAGINALIS DNA: NORMAL

## 2019-12-06 LAB
C TRACH DNA GENITAL QL NAA+PROBE: NEGATIVE
N. GONORRHOEAE DNA: NEGATIVE

## 2020-09-02 ENCOUNTER — OFFICE VISIT (OUTPATIENT)
Dept: GYNECOLOGY | Age: 30
End: 2020-09-02
Payer: COMMERCIAL

## 2020-09-02 VITALS
WEIGHT: 131.8 LBS | HEIGHT: 63 IN | TEMPERATURE: 97.4 F | DIASTOLIC BLOOD PRESSURE: 70 MMHG | SYSTOLIC BLOOD PRESSURE: 110 MMHG | BODY MASS INDEX: 23.35 KG/M2

## 2020-09-02 PROCEDURE — 99213 OFFICE O/P EST LOW 20 MIN: CPT | Performed by: OBSTETRICS & GYNECOLOGY

## 2020-09-02 PROCEDURE — 1036F TOBACCO NON-USER: CPT | Performed by: OBSTETRICS & GYNECOLOGY

## 2020-09-02 PROCEDURE — G8420 CALC BMI NORM PARAMETERS: HCPCS | Performed by: OBSTETRICS & GYNECOLOGY

## 2020-09-02 PROCEDURE — G8427 DOCREV CUR MEDS BY ELIG CLIN: HCPCS | Performed by: OBSTETRICS & GYNECOLOGY

## 2021-06-30 NOTE — PLAN OF CARE
Intermediate The teaching will focus on visit schedule and laboratory tests, plus additional topics such as health and lifestyle (e.g. kick counts, diet). This education can be found in the Discharge Instructions []   2   Complex New patient information packet given to the patient/caregiver and reviewed with the Registered Nurse.   []   3       Patient Discharge and Planning  Planning Definition Points   General Follow-up with routine assessment and planning. Discharge instructions and After Visit Summary given to patient/caregiver and reviewed with the Registered Nurse. Simple follow-up with routine assessment and planning. [x]   1   Intermediate Follow-up with routine assessment and planning. Discharge instructions and After Visit Summary given to patient/caregiver and reviewed with the Registered Nurse. Contact with additional resources (e.g. , Physician, Lactation). May include filling out forms, writing letters, communication with insurance , FLMA forms, etc.   []   2   Complex Full, comprehensive assessment and planning which includes assistance for a hospital admission or transfer to a higher level of care facility.   []   3     Is this the Patient's First Visit to the Prenatal Clinic    No     Is this Patient Established @ this [facility] [place]  Yes             Clinical Level of Care      Points  0-3  Level 1 []     Points  4-6  Level 2 []     Points  7-8  Level 3 []     Points  9-10  Level 4 []     Points  11-12  Level 5 []       Electronically signed by Chacho Gomez RN on 7/24/2018 at 4:27 PM
none

## 2021-07-22 NOTE — PLAN OF CARE
Problem: Pain:  Description  Pain management should include both nonpharmacologic and pharmacologic interventions.   Goal: Pain level will decrease  Description  Pain level will decrease  9/13/2019 1108 by Nata Kaminski RN  Outcome: Ongoing  9/13/2019 0449 by Madelyn Sims RN  Outcome: Met This Shift  Goal: Control of acute pain  Description  Control of acute pain  9/13/2019 1108 by Nata Kaminski RN  Outcome: Ongoing  9/13/2019 0449 by Madelyn Sims RN  Outcome: Met This Shift  Goal: Control of chronic pain  Description  Control of chronic pain  9/13/2019 1108 by Nata Kaminski RN  Outcome: Ongoing  9/13/2019 0449 by Madelyn Sims RN  Outcome: Met This Shift       Problem: Coping:  Goal: Coping behaviors will improve  Description  Coping behaviors will improve  9/13/2019 1108 by Nata Kaminski RN  Outcome: Ongoing  9/13/2019 0449 by Madelyn Sims RN  Outcome: Met This Shift  Goal: Ability to verbalize feelings will improve  Description  Ability to verbalize feelings will improve  9/13/2019 1108 by Nata Kaminski RN  Outcome: Ongoing  9/13/2019 0449 by Madelyn Sims RN  Outcome: Met This Shift     Problem: Fluid Volume:  Goal: Will maintain adequate fluid volume  Description  Will maintain adequate fluid volume  9/13/2019 1108 by Nata Kaminski RN  Outcome: Ongoing  9/13/2019 0449 by Madelyn Sims RN  Outcome: Met This Shift  Goal: Ability to achieve and maintain adequate urine output will improve  Description  Ability to achieve and maintain adequate urine output will improve  9/13/2019 1108 by Nata Kaminski RN  Outcome: Ongoing  9/13/2019 0449 by Madelyn Sims RN  Outcome: Met This Shift  Goal: Peripheral tissue perfusion will improve  Description  Peripheral tissue perfusion will improve  9/13/2019 1108 by Nata Kaminski RN  Outcome: Ongoing  9/13/2019 0449 by Madelyn Sims RN  Outcome: Met This
Problem: Pain:  Description  Pain management should include both nonpharmacologic and pharmacologic interventions.   Goal: Pain level will decrease  Description  Pain level will decrease  Outcome: Ongoing  Goal: Control of acute pain  Description  Control of acute pain  Outcome: Ongoing  Goal: Control of chronic pain  Description  Control of chronic pain  Outcome: Ongoing     Problem: Healthcare acquired conditions:  Goal: Absence of healthcare acquired conditions  Description  Absence of healthcare acquired conditions  Outcome: Ongoing     Problem: Coping:  Goal: Coping behaviors will improve  Description  Coping behaviors will improve  Outcome: Ongoing  Goal: Ability to verbalize feelings will improve  Description  Ability to verbalize feelings will improve  Outcome: Ongoing     Problem: Fluid Volume:  Goal: Will maintain adequate fluid volume  Description  Will maintain adequate fluid volume  Outcome: Ongoing  Goal: Ability to achieve and maintain adequate urine output will improve  Description  Ability to achieve and maintain adequate urine output will improve  Outcome: Ongoing  Goal: Peripheral tissue perfusion will improve  Description  Peripheral tissue perfusion will improve  Outcome: Ongoing     Problem: Life Cycle:  Goal: Chance of risk for complications during labor will decrease  Description  Chance of risk for complications during labor will decrease  Outcome: Ongoing  Goal: Will show no signs and symptoms of excessive bleeding  Description  Will show no signs and symptoms of excessive bleeding  Outcome: Ongoing     Problem: Physical Regulation:  Goal: Risk for medication side effects will decrease  Description  Risk for medication side effects will decrease  Outcome: Ongoing  Goal: Will remain free of preeclampsia complications  Description  Will remain free of preeclampsia complications  Outcome: Ongoing  Goal: Signs of adequate cerebral perfusion will increase  Description  Signs of adequate
Problem: Pain:  Goal: Pain level will decrease  Description  Pain level will decrease  Outcome: Ongoing   Pain/discomfort being managed with PRN analgesics per MD order.   Pt able to express presence and absence of pain and rate pain appropriately using numerical scale    Problem: Life Cycle:  Goal: Will show no signs and symptoms of excessive bleeding  Description  Will show no signs and symptoms of excessive bleeding  Outcome: Ongoing     Problem: VAGINAL DELIVERY - RECOVERY AND POST PARTUM  Goal: Vital signs are medically acceptable  Outcome: Ongoing     Problem: VAGINAL DELIVERY - RECOVERY AND POST PARTUM  Goal: Appropriate behavior observed  Outcome: Ongoing
Physical Regulation:  Goal: Risk for medication side effects will decrease  Description  Risk for medication side effects will decrease  Outcome: Met This Shift     Problem: Sensory:  Goal: Ability to compensate for vision loss will improve  Description  Ability to compensate for vision loss will improve  Outcome: Met This Shift
cerebral perfusion will increase  Outcome: Ongoing     Problem: Sensory:  Goal: Ability to compensate for vision loss will improve  Description  Ability to compensate for vision loss will improve  Outcome: Ongoing
- - -

## 2021-08-25 ENCOUNTER — OFFICE VISIT (OUTPATIENT)
Dept: PRIMARY CARE CLINIC | Age: 31
End: 2021-08-25
Payer: COMMERCIAL

## 2021-08-25 VITALS
HEIGHT: 63 IN | DIASTOLIC BLOOD PRESSURE: 75 MMHG | TEMPERATURE: 97.1 F | HEART RATE: 88 BPM | SYSTOLIC BLOOD PRESSURE: 113 MMHG | OXYGEN SATURATION: 100 % | WEIGHT: 136 LBS | BODY MASS INDEX: 24.1 KG/M2

## 2021-08-25 DIAGNOSIS — F33.3 SEVERE EPISODE OF RECURRENT MAJOR DEPRESSIVE DISORDER, WITH PSYCHOTIC FEATURES (HCC): Primary | ICD-10-CM

## 2021-08-25 DIAGNOSIS — Z11.59 NEED FOR HEPATITIS C SCREENING TEST: ICD-10-CM

## 2021-08-25 PROCEDURE — G8420 CALC BMI NORM PARAMETERS: HCPCS | Performed by: INTERNAL MEDICINE

## 2021-08-25 PROCEDURE — G8427 DOCREV CUR MEDS BY ELIG CLIN: HCPCS | Performed by: INTERNAL MEDICINE

## 2021-08-25 PROCEDURE — 1036F TOBACCO NON-USER: CPT | Performed by: INTERNAL MEDICINE

## 2021-08-25 PROCEDURE — 99204 OFFICE O/P NEW MOD 45 MIN: CPT | Performed by: INTERNAL MEDICINE

## 2021-08-25 SDOH — SOCIAL STABILITY: SOCIAL NETWORK: HOW OFTEN DO YOU GET TOGETHER WITH FRIENDS OR RELATIVES?: PATIENT DECLINED

## 2021-08-25 SDOH — ECONOMIC STABILITY: TRANSPORTATION INSECURITY
IN THE PAST 12 MONTHS, HAS THE LACK OF TRANSPORTATION KEPT YOU FROM MEDICAL APPOINTMENTS OR FROM GETTING MEDICATIONS?: PATIENT DECLINED

## 2021-08-25 SDOH — SOCIAL STABILITY: SOCIAL INSECURITY
WITHIN THE LAST YEAR, HAVE TO BEEN RAPED OR FORCED TO HAVE ANY KIND OF SEXUAL ACTIVITY BY YOUR PARTNER OR EX-PARTNER?: PATIENT DECLINED

## 2021-08-25 SDOH — ECONOMIC STABILITY: FOOD INSECURITY: WITHIN THE PAST 12 MONTHS, THE FOOD YOU BOUGHT JUST DIDN'T LAST AND YOU DIDN'T HAVE MONEY TO GET MORE.: PATIENT DECLINED

## 2021-08-25 SDOH — SOCIAL STABILITY: SOCIAL NETWORK
DO YOU BELONG TO ANY CLUBS OR ORGANIZATIONS SUCH AS CHURCH GROUPS UNIONS, FRATERNAL OR ATHLETIC GROUPS, OR SCHOOL GROUPS?: PATIENT DECLINED

## 2021-08-25 SDOH — ECONOMIC STABILITY: TRANSPORTATION INSECURITY
IN THE PAST 12 MONTHS, HAS LACK OF TRANSPORTATION KEPT YOU FROM MEETINGS, WORK, OR FROM GETTING THINGS NEEDED FOR DAILY LIVING?: PATIENT DECLINED

## 2021-08-25 SDOH — ECONOMIC STABILITY: INCOME INSECURITY: IN THE LAST 12 MONTHS, WAS THERE A TIME WHEN YOU WERE NOT ABLE TO PAY THE MORTGAGE OR RENT ON TIME?: PATIENT REFUSED

## 2021-08-25 SDOH — HEALTH STABILITY: PHYSICAL HEALTH: ON AVERAGE, HOW MANY MINUTES DO YOU ENGAGE IN EXERCISE AT THIS LEVEL?: PATIENT DECLINED

## 2021-08-25 SDOH — SOCIAL STABILITY: SOCIAL INSECURITY
WITHIN THE LAST YEAR, HAVE YOU BEEN HUMILIATED OR EMOTIONALLY ABUSED IN OTHER WAYS BY YOUR PARTNER OR EX-PARTNER?: PATIENT DECLINED

## 2021-08-25 SDOH — ECONOMIC STABILITY: INCOME INSECURITY: HOW HARD IS IT FOR YOU TO PAY FOR THE VERY BASICS LIKE FOOD, HOUSING, MEDICAL CARE, AND HEATING?: PATIENT DECLINED

## 2021-08-25 SDOH — HEALTH STABILITY: MENTAL HEALTH: HOW OFTEN DO YOU HAVE A DRINK CONTAINING ALCOHOL?: PATIENT DECLINED

## 2021-08-25 SDOH — SOCIAL STABILITY: SOCIAL NETWORK: HOW OFTEN DO YOU ATTEND CHURCH OR RELIGIOUS SERVICES?: PATIENT DECLINED

## 2021-08-25 SDOH — SOCIAL STABILITY: SOCIAL INSECURITY: WITHIN THE LAST YEAR, HAVE YOU BEEN AFRAID OF YOUR PARTNER OR EX-PARTNER?: PATIENT DECLINED

## 2021-08-25 SDOH — SOCIAL STABILITY: SOCIAL NETWORK: HOW OFTEN DO YOU ATTENT MEETINGS OF THE CLUB OR ORGANIZATION YOU BELONG TO?: PATIENT DECLINED

## 2021-08-25 SDOH — HEALTH STABILITY: MENTAL HEALTH
STRESS IS WHEN SOMEONE FEELS TENSE, NERVOUS, ANXIOUS, OR CAN'T SLEEP AT NIGHT BECAUSE THEIR MIND IS TROUBLED. HOW STRESSED ARE YOU?: PATIENT DECLINED

## 2021-08-25 SDOH — ECONOMIC STABILITY: HOUSING INSECURITY
IN THE LAST 12 MONTHS, WAS THERE A TIME WHEN YOU DID NOT HAVE A STEADY PLACE TO SLEEP OR SLEPT IN A SHELTER (INCLUDING NOW)?: PATIENT REFUSED

## 2021-08-25 SDOH — SOCIAL STABILITY: SOCIAL NETWORK: ARE YOU MARRIED, WIDOWED, DIVORCED, SEPARATED, NEVER MARRIED, OR LIVING WITH A PARTNER?: PATIENT DECLINED

## 2021-08-25 SDOH — ECONOMIC STABILITY: FOOD INSECURITY: WITHIN THE PAST 12 MONTHS, YOU WORRIED THAT YOUR FOOD WOULD RUN OUT BEFORE YOU GOT MONEY TO BUY MORE.: PATIENT DECLINED

## 2021-08-25 SDOH — HEALTH STABILITY: MENTAL HEALTH: HOW MANY STANDARD DRINKS CONTAINING ALCOHOL DO YOU HAVE ON A TYPICAL DAY?: PATIENT DECLINED

## 2021-08-25 SDOH — SOCIAL STABILITY: SOCIAL NETWORK: IN A TYPICAL WEEK, HOW MANY TIMES DO YOU TALK ON THE PHONE WITH FAMILY, FRIENDS, OR NEIGHBORS?: PATIENT DECLINED

## 2021-08-25 SDOH — HEALTH STABILITY: PHYSICAL HEALTH
ON AVERAGE, HOW MANY DAYS PER WEEK DO YOU ENGAGE IN MODERATE TO STRENUOUS EXERCISE (LIKE A BRISK WALK)?: PATIENT DECLINED

## 2021-08-25 SDOH — SOCIAL STABILITY: SOCIAL INSECURITY
WITHIN THE LAST YEAR, HAVE YOU BEEN KICKED, HIT, SLAPPED, OR OTHERWISE PHYSICALLY HURT BY YOUR PARTNER OR EX-PARTNER?: PATIENT DECLINED

## 2021-08-25 ASSESSMENT — PATIENT HEALTH QUESTIONNAIRE - PHQ9
SUM OF ALL RESPONSES TO PHQ QUESTIONS 1-9: 16
8. MOVING OR SPEAKING SO SLOWLY THAT OTHER PEOPLE COULD HAVE NOTICED. OR THE OPPOSITE, BEING SO FIGETY OR RESTLESS THAT YOU HAVE BEEN MOVING AROUND A LOT MORE THAN USUAL: 2
2. FEELING DOWN, DEPRESSED OR HOPELESS: 1
3. TROUBLE FALLING OR STAYING ASLEEP: 1
9. THOUGHTS THAT YOU WOULD BE BETTER OFF DEAD, OR OF HURTING YOURSELF: 1
7. TROUBLE CONCENTRATING ON THINGS, SUCH AS READING THE NEWSPAPER OR WATCHING TELEVISION: 2
SUM OF ALL RESPONSES TO PHQ QUESTIONS 1-9: 16
SUM OF ALL RESPONSES TO PHQ QUESTIONS 1-9: 15
10. IF YOU CHECKED OFF ANY PROBLEMS, HOW DIFFICULT HAVE THESE PROBLEMS MADE IT FOR YOU TO DO YOUR WORK, TAKE CARE OF THINGS AT HOME, OR GET ALONG WITH OTHER PEOPLE: 0
1. LITTLE INTEREST OR PLEASURE IN DOING THINGS: 2
5. POOR APPETITE OR OVEREATING: 1
6. FEELING BAD ABOUT YOURSELF - OR THAT YOU ARE A FAILURE OR HAVE LET YOURSELF OR YOUR FAMILY DOWN: 3
4. FEELING TIRED OR HAVING LITTLE ENERGY: 3
SUM OF ALL RESPONSES TO PHQ9 QUESTIONS 1 & 2: 3

## 2021-08-25 ASSESSMENT — ENCOUNTER SYMPTOMS
EYES NEGATIVE: 1
RESPIRATORY NEGATIVE: 1
GASTROINTESTINAL NEGATIVE: 1

## 2021-08-25 ASSESSMENT — COLUMBIA-SUICIDE SEVERITY RATING SCALE - C-SSRS
3. HAVE YOU BEEN THINKING ABOUT HOW YOU MIGHT KILL YOURSELF?: NO
5. HAVE YOU STARTED TO WORK OUT OR WORKED OUT THE DETAILS OF HOW TO KILL YOURSELF? DO YOU INTEND TO CARRY OUT THIS PLAN?: NO
4. HAVE YOU HAD THESE THOUGHTS AND HAD SOME INTENTION OF ACTING ON THEM?: NO
1. WITHIN THE PAST MONTH, HAVE YOU WISHED YOU WERE DEAD OR WISHED YOU COULD GO TO SLEEP AND NOT WAKE UP?: YES
6. HAVE YOU EVER DONE ANYTHING, STARTED TO DO ANYTHING, OR PREPARED TO DO ANYTHING TO END YOUR LIFE?: NO
2. HAVE YOU ACTUALLY HAD ANY THOUGHTS OF KILLING YOURSELF?: YES

## 2021-08-25 NOTE — PROGRESS NOTES
Lindy Reaves (:  1990) is a 28 y.o. female,Established patient, here for evaluation of the following chief complaint(s):  Established New Doctor    The patient stated that she felt as though everyone would be better off if she were not around anymore. She is currently seeing a therapist.  She was sent to primary care because she needs medication started for her depression. She is tired all the time. She has a history of anemia and has recently not been taking the oral iron medication. The last CBC was 2 years ago so she may have corrected since then. Repeat the CBC and start her back on iron if she needs it. She was started on Trintellix and given samples of the 5 mg pills and asked to follow-up in a month. She was also asked to call me if she was not getting adequate relief from her depression from the Trintellix after a week so that I can adjust the dose. ASSESSMENT/PLAN:  1. Severe episode of recurrent major depressive disorder, with psychotic features ([facility]  -     Comprehensive Metabolic Panel; Future  -     CBC Auto Differential; Future  -     TSH WITH REFLEX TO FT4; Future  -     Urinalysis; Future  -     Hemoglobin A1C; Future  -     HEPATITIS C ANTIBODY; Future  2. Need for hepatitis C screening test  -     HEPATITIS C ANTIBODY; Future      Return in about 4 weeks (around 2021). Subjective   SUBJECTIVE/OBJECTIVE:  Mental Health Problem  The primary symptoms include dysphoric mood. The current episode started more than 2 weeks ago. The onset of the illness is precipitated by emotional stress. The degree of incapacity that she is experiencing as a consequence of her illness is severe. Additional symptoms of the illness include anhedonia, fatigue, feelings of worthlessness and headaches. She admits to suicidal ideas. Review of Systems   Constitutional: Positive for fatigue. HENT: Negative. Eyes: Negative. Respiratory: Negative. Cardiovascular: Negative. Gastrointestinal: Negative. Genitourinary: Negative. Musculoskeletal: Negative. Skin: Negative. Neurological: Positive for headaches. Psychiatric/Behavioral: Positive for dysphoric mood. Objective   Physical Exam  Constitutional:       Appearance: She is well-developed. HENT:      Head: Normocephalic and atraumatic. Eyes:      Conjunctiva/sclera: Conjunctivae normal.      Pupils: Pupils are equal, round, and reactive to light. Cardiovascular:      Rate and Rhythm: Normal rate and regular rhythm. Heart sounds: Normal heart sounds. Pulmonary:      Effort: Pulmonary effort is normal.      Breath sounds: Normal breath sounds. Musculoskeletal:         General: Normal range of motion. Cervical back: Normal range of motion and neck supple. Skin:     General: Skin is warm and dry. Neurological:      Mental Status: She is alert and oriented to person, place, and time. Deep Tendon Reflexes: Reflexes are normal and symmetric. Psychiatric:      Comments: The patient expressed her suicidal ideation during the visit. She is sad and teared up during the visit. Her mood did improve and she was smiling by the end of the visit but she does need a lot of support. No problem-specific Assessment & Plan notes found for this encounter. On this date 8/25/2021 I have spent 45 minutes reviewing previous notes, test results and face to face with the patient discussing the diagnosis and importance of compliance with the treatment plan as well as documenting on the day of the visit. An electronic signature was used to authenticate this note.     --Whitney Masterson MD

## 2021-08-27 ENCOUNTER — PATIENT MESSAGE (OUTPATIENT)
Dept: PRIMARY CARE CLINIC | Age: 31
End: 2021-08-27

## 2021-08-30 DIAGNOSIS — D50.8 OTHER IRON DEFICIENCY ANEMIA: ICD-10-CM

## 2021-08-30 RX ORDER — LANOLIN ALCOHOL/MO/W.PET/CERES
325 CREAM (GRAM) TOPICAL
Qty: 270 TABLET | Refills: 1 | Status: SHIPPED | OUTPATIENT
Start: 2021-08-30

## 2021-08-30 NOTE — TELEPHONE ENCOUNTER
Spoke with patient she states [facility] not taking [facility]ource please send to [facility]. Prescription sent to [facility].

## 2021-09-28 ENCOUNTER — OFFICE VISIT (OUTPATIENT)
Dept: PRIMARY CARE CLINIC | Age: 31
End: 2021-09-28
Payer: COMMERCIAL

## 2021-09-28 VITALS
SYSTOLIC BLOOD PRESSURE: 119 MMHG | BODY MASS INDEX: 24.41 KG/M2 | DIASTOLIC BLOOD PRESSURE: 76 MMHG | HEIGHT: 63 IN | WEIGHT: 137.8 LBS

## 2021-09-28 DIAGNOSIS — F33.3 SEVERE EPISODE OF RECURRENT MAJOR DEPRESSIVE DISORDER, WITH PSYCHOTIC FEATURES (HCC): Primary | ICD-10-CM

## 2021-09-28 PROCEDURE — 1036F TOBACCO NON-USER: CPT | Performed by: INTERNAL MEDICINE

## 2021-09-28 PROCEDURE — G8420 CALC BMI NORM PARAMETERS: HCPCS | Performed by: INTERNAL MEDICINE

## 2021-09-28 PROCEDURE — 99214 OFFICE O/P EST MOD 30 MIN: CPT | Performed by: INTERNAL MEDICINE

## 2021-09-28 PROCEDURE — G8427 DOCREV CUR MEDS BY ELIG CLIN: HCPCS | Performed by: INTERNAL MEDICINE

## 2021-09-28 ASSESSMENT — ENCOUNTER SYMPTOMS
GASTROINTESTINAL NEGATIVE: 1
RESPIRATORY NEGATIVE: 1
EYES NEGATIVE: 1

## 2021-09-28 NOTE — PROGRESS NOTES
Lindy Reaves (:  1990) is a 28 y.o. female,Established patient, here for evaluation of the following chief complaint(s):  Follow-up    The patient's mood has improved significantly. She feels less overwhelmed on the current dose of Trintellix, 5 mg daily. She does not feel as though she needs an increased dose at this point. She continues follow-up with her counselor. She also has a mild anemia which may be secondary to increased menstrual flow. She needs to follow-up with her gynecologist. She had an upset stomach with the iron when it was taken with the Trintellix. I am asking her to discontinue the iron for now but definitely continue the Trintellix. ASSESSMENT/PLAN:  1. Severe episode of recurrent major depressive disorder, with psychotic features ([facility]  -     VORTIoxetine (TRINTELLIX) 5 MG tablet; Take 1 tablet by mouth daily for 28 days [id number] Farideh 2024 x4, Disp-28 tablet, R-5Sample      Return in about 2 months (around 2021). Subjective   SUBJECTIVE/OBJECTIVE:  Mental Health Problem  The primary symptoms include negative symptoms. Primary symptoms comment: Improved mood and reduction in depression over the last month on medication. She denies any suicidal ideation at the present time. . The current episode started more than 1 month ago. The onset of the illness is precipitated by a stressful event and emotional stress. She does not admit to suicidal ideas. She does not have a plan to attempt suicide. She does not contemplate harming herself. Other  This is a chronic (Iron deficiency anemia) problem. The problem occurs constantly. The problem has been unchanged. The symptoms are aggravated by stress. Treatments tried: Oral iron and vitamin C. Improvement on treatment: The patient could not tolerate the oral iron with Trintellix. . Most of her symptoms were GI in origin. The Trintellix has improved her appetite however and she is eating more.        Review of Systems Constitutional: Negative. HENT: Negative. Eyes: Negative. Respiratory: Negative. Cardiovascular: Negative. Gastrointestinal: Negative. Genitourinary: Negative. Musculoskeletal: Negative. Skin: Negative. Neurological: Negative. Psychiatric/Behavioral: Negative. Objective   Physical Exam  Constitutional:       Appearance: She is well-developed. HENT:      Head: Normocephalic and atraumatic. Eyes:      Conjunctiva/sclera: Conjunctivae normal.      Pupils: Pupils are equal, round, and reactive to light. Cardiovascular:      Rate and Rhythm: Normal rate and regular rhythm. Heart sounds: Normal heart sounds. Pulmonary:      Effort: Pulmonary effort is normal.      Breath sounds: Normal breath sounds. Musculoskeletal:         General: Normal range of motion. Cervical back: Normal range of motion and neck supple. Skin:     General: Skin is warm and dry. Neurological:      Mental Status: She is alert and oriented to person, place, and time. Deep Tendon Reflexes: Reflexes are normal and symmetric. No problem-specific Assessment & Plan notes found for this encounter. On this date 9/28/2021 I have spent 35 minutes reviewing previous notes, test results and face to face with the patient discussing the diagnosis and importance of compliance with the treatment plan as well as documenting on the day of the visit. An electronic signature was used to authenticate this note.     --Heidy Juarez MD

## 2021-12-01 ENCOUNTER — OFFICE VISIT (OUTPATIENT)
Dept: PRIMARY CARE CLINIC | Age: 31
End: 2021-12-01
Payer: COMMERCIAL

## 2021-12-01 VITALS
HEART RATE: 68 BPM | WEIGHT: 139 LBS | SYSTOLIC BLOOD PRESSURE: 123 MMHG | TEMPERATURE: 98.2 F | BODY MASS INDEX: 24.63 KG/M2 | OXYGEN SATURATION: 98 % | DIASTOLIC BLOOD PRESSURE: 70 MMHG | HEIGHT: 63 IN

## 2021-12-01 DIAGNOSIS — B37.31 VAGINAL CANDIDIASIS: ICD-10-CM

## 2021-12-01 DIAGNOSIS — F33.3 SEVERE EPISODE OF RECURRENT MAJOR DEPRESSIVE DISORDER, WITH PSYCHOTIC FEATURES (HCC): Primary | ICD-10-CM

## 2021-12-01 DIAGNOSIS — Z34.92 NORMAL PREGNANCY, SECOND TRIMESTER: ICD-10-CM

## 2021-12-01 PROCEDURE — 1036F TOBACCO NON-USER: CPT | Performed by: INTERNAL MEDICINE

## 2021-12-01 PROCEDURE — G8484 FLU IMMUNIZE NO ADMIN: HCPCS | Performed by: INTERNAL MEDICINE

## 2021-12-01 PROCEDURE — G8427 DOCREV CUR MEDS BY ELIG CLIN: HCPCS | Performed by: INTERNAL MEDICINE

## 2021-12-01 PROCEDURE — 99214 OFFICE O/P EST MOD 30 MIN: CPT | Performed by: INTERNAL MEDICINE

## 2021-12-01 PROCEDURE — G8420 CALC BMI NORM PARAMETERS: HCPCS | Performed by: INTERNAL MEDICINE

## 2021-12-01 RX ORDER — FLUCONAZOLE 150 MG/1
150 TABLET ORAL ONCE
Qty: 1 TABLET | Refills: 0 | Status: SHIPPED | OUTPATIENT
Start: 2021-12-01 | End: 2021-12-01

## 2021-12-01 ASSESSMENT — ENCOUNTER SYMPTOMS
GASTROINTESTINAL NEGATIVE: 1
EYES NEGATIVE: 1
RESPIRATORY NEGATIVE: 1

## 2021-12-01 NOTE — PROGRESS NOTES
Lindy Reaves (:  1990) is a 28 y.o. female,Established patient, here for evaluation of the following chief complaint(s):  Follow-up (prescription trintillex) and Yeast Infection (2 days)    Improved depression on Trintellix. Continue the current 5 mg dose daily and follow-up with your therapist.  The patient also admits to a vaginal yeast infection. Diflucan has been sent to the pharmacy. ASSESSMENT/PLAN:  1. Severe episode of recurrent major depressive disorder, with psychotic features ([facility]  -     VORTIoxetine (TRINTELLIX) 5 MG tablet; Take 2 tablets by mouth daily, Disp-30 tablet, R-5Normal  2. Normal pregnancy, second trimester  -     fluconazole (DIFLUCAN) 150 MG tablet; Take 1 tablet by mouth once for 1 dose, Disp-1 tablet, R-0Normal  3. Vaginal candidiasis  -     fluconazole (DIFLUCAN) 150 MG tablet; Take 1 tablet by mouth once for 1 dose, Disp-1 tablet, R-0Normal      Return in about 6 months (around 2022). Subjective   SUBJECTIVE/OBJECTIVE:  Mental Health Problem  Primary symptoms comment: Depression. The current episode started more than 1 month ago. This is a chronic problem. The degree of incapacity that she is experiencing as a consequence of her illness is moderate. She does not have a plan to attempt suicide. She does not contemplate harming herself. She has not already injured self. She does not contemplate injuring another person. She has not already  injured another person. Vaginal Discharge  The patient's primary symptoms include vaginal discharge. Primary symptoms comment: Depression. This is a recurrent problem. Review of Systems   Constitutional: Negative. HENT: Negative. Eyes: Negative. Respiratory: Negative. Cardiovascular: Negative. Gastrointestinal: Negative. Genitourinary: Positive for vaginal discharge. Musculoskeletal: Negative. Skin: Negative. Neurological: Negative. Psychiatric/Behavioral: Negative. Objective   Physical Exam  Constitutional:       Appearance: She is well-developed. HENT:      Head: Normocephalic and atraumatic. Eyes:      Conjunctiva/sclera: Conjunctivae normal.      Pupils: Pupils are equal, round, and reactive to light. Cardiovascular:      Rate and Rhythm: Normal rate and regular rhythm. Heart sounds: Normal heart sounds. Pulmonary:      Effort: Pulmonary effort is normal.      Breath sounds: Normal breath sounds. Musculoskeletal:         General: Normal range of motion. Cervical back: Normal range of motion and neck supple. Skin:     General: Skin is warm and dry. Neurological:      Mental Status: She is alert and oriented to person, place, and time. Deep Tendon Reflexes: Reflexes are normal and symmetric. No problem-specific Assessment & Plan notes found for this encounter. On this date 12/1/2021 I have spent 30 minutes reviewing previous notes, test results and face to face with the patient discussing the diagnosis and importance of compliance with the treatment plan as well as documenting on the day of the visit. An electronic signature was used to authenticate this note.     --Aaron Russo MD

## 2021-12-01 NOTE — PATIENT INSTRUCTIONS
Fluconazole and Trintellix have been sent to the pharmacy. Use both as directed. Continue follow up with your therapist. See me in 6 months.

## 2021-12-02 ENCOUNTER — TELEPHONE (OUTPATIENT)
Dept: ORTHOPEDIC SURGERY | Age: 31
End: 2021-12-02

## 2021-12-02 NOTE — TELEPHONE ENCOUNTER
PA submitted via CM for Trintellix (formerly Brintellix) 5MG tablets.   Key: [id number] - PA Case ID: Marcia Cartwright - Rx #: [id number]    STATUS: PENDING

## 2021-12-06 DIAGNOSIS — F33.3 SEVERE EPISODE OF RECURRENT MAJOR DEPRESSIVE DISORDER, WITH PSYCHOTIC FEATURES (HCC): ICD-10-CM

## 2021-12-06 NOTE — TELEPHONE ENCOUNTER
Received DENIAL for Trintellix (formerly Brintellix) 5MG tablets. Denial letter attached. Please advise patient. Thank you.

## 2025-07-23 ENCOUNTER — APPOINTMENT (OUTPATIENT)
Dept: GENERAL RADIOLOGY | Age: 35
End: 2025-07-23
Payer: OTHER MISCELLANEOUS

## 2025-07-23 ENCOUNTER — HOSPITAL ENCOUNTER (EMERGENCY)
Age: 35
Discharge: HOME | End: 2025-07-23
Attending: STUDENT IN AN ORGANIZED HEALTH CARE EDUCATION/TRAINING PROGRAM
Payer: OTHER MISCELLANEOUS

## 2025-07-23 VITALS
HEIGHT: 66 IN | HEART RATE: 69 BPM | BODY MASS INDEX: 21.69 KG/M2 | TEMPERATURE: 97.5 F | RESPIRATION RATE: 14 BRPM | OXYGEN SATURATION: 100 % | SYSTOLIC BLOOD PRESSURE: 109 MMHG | DIASTOLIC BLOOD PRESSURE: 86 MMHG | WEIGHT: 135 LBS
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
## 2025-07-23 DIAGNOSIS — M25.511 ACUTE PAIN OF RIGHT SHOULDER: ICD-10-CM

## 2025-07-23 DIAGNOSIS — S16.1XXA STRAIN OF NECK MUSCLE, INITIAL ENCOUNTER: ICD-10-CM

## 2025-07-23 PROCEDURE — 6370000000 HC RX 637 (ALT 250 FOR IP): Performed by: STUDENT IN AN ORGANIZED HEALTH CARE EDUCATION/TRAINING PROGRAM

## 2025-07-23 PROCEDURE — 99283 EMERGENCY DEPT VISIT LOW MDM: CPT

## 2025-07-23 PROCEDURE — 73030 X-RAY EXAM OF SHOULDER: CPT

## 2025-07-23 RX ORDER — CYCLOBENZAPRINE HCL 10 MG
10 TABLET ORAL EVERY 8 HOURS
Qty: 21 TABLET | Refills: 0 | Status: SHIPPED | OUTPATIENT
Start: 2025-07-23 | End: 2025-07-30

## 2025-07-23 RX ORDER — ACETAMINOPHEN 325 MG/1
650 TABLET ORAL ONCE
Status: COMPLETED | OUTPATIENT
Start: 2025-07-23 | End: 2025-07-23

## 2025-07-23 RX ORDER — IBUPROFEN 600 MG/1
600 TABLET, FILM COATED ORAL EVERY 6 HOURS PRN
Qty: 30 TABLET | Refills: 0 | Status: SHIPPED | OUTPATIENT
Start: 2025-07-23

## 2025-07-23 RX ORDER — ACETAMINOPHEN 325 MG/1
650 TABLET ORAL EVERY 6 HOURS PRN
Qty: 30 TABLET | Refills: 0 | Status: SHIPPED | OUTPATIENT
Start: 2025-07-23

## 2025-07-23 RX ORDER — IBUPROFEN 400 MG/1
800 TABLET, FILM COATED ORAL ONCE
Status: COMPLETED | OUTPATIENT
Start: 2025-07-23 | End: 2025-07-23

## 2025-07-23 RX ADMIN — IBUPROFEN 800 MG: 400 TABLET, FILM COATED ORAL at 10:01

## 2025-07-23 RX ADMIN — ACETAMINOPHEN 650 MG: 325 TABLET ORAL at 10:01

## 2025-07-23 ASSESSMENT — PAIN DESCRIPTION - DESCRIPTORS: DESCRIPTORS: ACHING

## 2025-07-23 ASSESSMENT — PAIN - FUNCTIONAL ASSESSMENT
PAIN_FUNCTIONAL_ASSESSMENT: ACTIVITIES ARE NOT PREVENTED
PAIN_FUNCTIONAL_ASSESSMENT: 0-10

## 2025-07-23 ASSESSMENT — PAIN SCALES - GENERAL
PAINLEVEL_OUTOF10: 2
PAINLEVEL_OUTOF10: 5

## 2025-07-23 ASSESSMENT — PAIN DESCRIPTION - PAIN TYPE: TYPE: ACUTE PAIN

## 2025-07-23 NOTE — DISCHARGE INSTRUCTIONS
Please follow-up primary care doctor.  Please come back to the emergency department for have acute worsening symptoms.
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
